# Patient Record
Sex: MALE | Race: WHITE | NOT HISPANIC OR LATINO
[De-identification: names, ages, dates, MRNs, and addresses within clinical notes are randomized per-mention and may not be internally consistent; named-entity substitution may affect disease eponyms.]

---

## 2017-07-25 ENCOUNTER — APPOINTMENT (OUTPATIENT)
Dept: COLORECTAL SURGERY | Facility: CLINIC | Age: 57
End: 2017-07-25

## 2017-07-25 VITALS — HEART RATE: 60 BPM | RESPIRATION RATE: 14 BRPM | TEMPERATURE: 97.8 F

## 2017-07-25 DIAGNOSIS — L29.0 PRURITUS ANI: ICD-10-CM

## 2017-07-25 DIAGNOSIS — Z80.9 FAMILY HISTORY OF MALIGNANT NEOPLASM, UNSPECIFIED: ICD-10-CM

## 2017-07-25 DIAGNOSIS — K64.9 UNSPECIFIED HEMORRHOIDS: ICD-10-CM

## 2017-07-25 DIAGNOSIS — Z80.0 FAMILY HISTORY OF MALIGNANT NEOPLASM OF DIGESTIVE ORGANS: ICD-10-CM

## 2017-07-25 DIAGNOSIS — Z78.9 OTHER SPECIFIED HEALTH STATUS: ICD-10-CM

## 2017-07-25 DIAGNOSIS — Z87.39 PERSONAL HISTORY OF OTHER DISEASES OF THE MUSCULOSKELETAL SYSTEM AND CONNECTIVE TISSUE: ICD-10-CM

## 2017-07-25 DIAGNOSIS — Z86.79 PERSONAL HISTORY OF OTHER DISEASES OF THE CIRCULATORY SYSTEM: ICD-10-CM

## 2018-02-13 ENCOUNTER — NON-APPOINTMENT (OUTPATIENT)
Age: 58
End: 2018-02-13

## 2018-02-13 ENCOUNTER — APPOINTMENT (OUTPATIENT)
Dept: ELECTROPHYSIOLOGY | Facility: CLINIC | Age: 58
End: 2018-02-13
Payer: COMMERCIAL

## 2018-02-13 VITALS
WEIGHT: 195 LBS | HEIGHT: 70 IN | SYSTOLIC BLOOD PRESSURE: 140 MMHG | DIASTOLIC BLOOD PRESSURE: 84 MMHG | HEART RATE: 64 BPM | BODY MASS INDEX: 27.92 KG/M2

## 2018-02-13 PROCEDURE — 99205 OFFICE O/P NEW HI 60 MIN: CPT

## 2018-02-13 PROCEDURE — 93000 ELECTROCARDIOGRAM COMPLETE: CPT

## 2018-02-13 RX ORDER — CEFUROXIME AXETIL 250 MG/1
250 TABLET ORAL
Qty: 14 | Refills: 0 | Status: DISCONTINUED | COMMUNITY
Start: 2017-05-15 | End: 2018-02-13

## 2018-02-13 RX ORDER — HYDROCORTISONE 25 MG/G
2.5 CREAM TOPICAL
Qty: 2835 | Refills: 0 | Status: DISCONTINUED | COMMUNITY
Start: 2016-06-03 | End: 2018-02-13

## 2018-02-13 RX ORDER — CIPROFLOXACIN 3 MG/ML
0.3 SOLUTION OPHTHALMIC
Qty: 5 | Refills: 0 | Status: DISCONTINUED | COMMUNITY
Start: 2017-05-15 | End: 2018-02-13

## 2018-03-13 ENCOUNTER — APPOINTMENT (OUTPATIENT)
Dept: MRI IMAGING | Facility: CLINIC | Age: 58
End: 2018-03-13

## 2018-03-13 ENCOUNTER — OUTPATIENT (OUTPATIENT)
Dept: OUTPATIENT SERVICES | Facility: HOSPITAL | Age: 58
LOS: 1 days | End: 2018-03-13
Payer: COMMERCIAL

## 2018-03-13 DIAGNOSIS — Z00.8 ENCOUNTER FOR OTHER GENERAL EXAMINATION: ICD-10-CM

## 2018-03-13 PROCEDURE — 75561 CARDIAC MRI FOR MORPH W/DYE: CPT | Mod: 26

## 2018-03-13 PROCEDURE — 75561 CARDIAC MRI FOR MORPH W/DYE: CPT

## 2018-03-13 PROCEDURE — A9585: CPT

## 2018-04-11 ENCOUNTER — APPOINTMENT (OUTPATIENT)
Dept: ELECTROPHYSIOLOGY | Facility: CLINIC | Age: 58
End: 2018-04-11
Payer: COMMERCIAL

## 2018-04-11 ENCOUNTER — NON-APPOINTMENT (OUTPATIENT)
Age: 58
End: 2018-04-11

## 2018-04-11 VITALS
SYSTOLIC BLOOD PRESSURE: 130 MMHG | BODY MASS INDEX: 27.92 KG/M2 | HEIGHT: 70 IN | DIASTOLIC BLOOD PRESSURE: 80 MMHG | HEART RATE: 64 BPM | WEIGHT: 195 LBS

## 2018-04-11 PROCEDURE — 99215 OFFICE O/P EST HI 40 MIN: CPT

## 2018-07-27 PROBLEM — Z80.0 FAMILY HISTORY OF COLON CANCER: Status: ACTIVE | Noted: 2017-07-25

## 2018-08-01 ENCOUNTER — APPOINTMENT (OUTPATIENT)
Dept: CARDIOTHORACIC SURGERY | Facility: CLINIC | Age: 58
End: 2018-08-01
Payer: COMMERCIAL

## 2018-08-01 VITALS
RESPIRATION RATE: 15 BRPM | WEIGHT: 195 LBS | TEMPERATURE: 98.4 F | OXYGEN SATURATION: 97 % | BODY MASS INDEX: 27.92 KG/M2 | DIASTOLIC BLOOD PRESSURE: 78 MMHG | HEIGHT: 70 IN | SYSTOLIC BLOOD PRESSURE: 146 MMHG | HEART RATE: 65 BPM

## 2018-08-01 VITALS — DIASTOLIC BLOOD PRESSURE: 86 MMHG | SYSTOLIC BLOOD PRESSURE: 137 MMHG

## 2018-08-01 PROCEDURE — 99201 OFFICE OUTPATIENT NEW 10 MINUTES: CPT | Mod: 25

## 2018-08-01 PROCEDURE — 99245 OFF/OP CONSLTJ NEW/EST HI 55: CPT

## 2018-08-22 ENCOUNTER — OUTPATIENT (OUTPATIENT)
Dept: OUTPATIENT SERVICES | Age: 58
LOS: 1 days | Discharge: HOME | End: 2018-08-22

## 2018-08-23 ENCOUNTER — APPOINTMENT (OUTPATIENT)
Dept: PEDIATRIC CARDIOLOGY | Facility: CLINIC | Age: 58
End: 2018-08-23
Payer: COMMERCIAL

## 2018-08-23 ENCOUNTER — APPOINTMENT (OUTPATIENT)
Dept: CARDIOTHORACIC SURGERY | Facility: CLINIC | Age: 58
End: 2018-08-23
Payer: COMMERCIAL

## 2018-08-23 VITALS
DIASTOLIC BLOOD PRESSURE: 82 MMHG | SYSTOLIC BLOOD PRESSURE: 129 MMHG | RESPIRATION RATE: 16 BRPM | BODY MASS INDEX: 28.73 KG/M2 | HEIGHT: 69.69 IN | HEART RATE: 64 BPM | OXYGEN SATURATION: 96 % | WEIGHT: 198.42 LBS

## 2018-08-23 DIAGNOSIS — Z13.6 ENCOUNTER FOR SCREENING FOR CARDIOVASCULAR DISORDERS: ICD-10-CM

## 2018-08-23 PROCEDURE — 93320 DOPPLER ECHO COMPLETE: CPT

## 2018-08-23 PROCEDURE — 93325 DOPPLER ECHO COLOR FLOW MAPG: CPT

## 2018-08-23 PROCEDURE — 99205 OFFICE O/P NEW HI 60 MIN: CPT | Mod: 25

## 2018-08-23 PROCEDURE — 93303 ECHO TRANSTHORACIC: CPT

## 2018-08-23 PROCEDURE — 99244 OFF/OP CNSLTJ NEW/EST MOD 40: CPT

## 2018-08-23 PROCEDURE — 93000 ELECTROCARDIOGRAM COMPLETE: CPT

## 2019-03-06 ENCOUNTER — APPOINTMENT (OUTPATIENT)
Dept: ELECTROPHYSIOLOGY | Facility: CLINIC | Age: 59
End: 2019-03-06

## 2019-03-26 ENCOUNTER — NON-APPOINTMENT (OUTPATIENT)
Age: 59
End: 2019-03-26

## 2019-03-26 ENCOUNTER — APPOINTMENT (OUTPATIENT)
Dept: ELECTROPHYSIOLOGY | Facility: CLINIC | Age: 59
End: 2019-03-26
Payer: COMMERCIAL

## 2019-03-26 VITALS
BODY MASS INDEX: 28 KG/M2 | HEIGHT: 71 IN | DIASTOLIC BLOOD PRESSURE: 77 MMHG | WEIGHT: 200 LBS | HEART RATE: 65 BPM | SYSTOLIC BLOOD PRESSURE: 126 MMHG

## 2019-03-26 PROCEDURE — 93000 ELECTROCARDIOGRAM COMPLETE: CPT

## 2019-03-26 PROCEDURE — 99215 OFFICE O/P EST HI 40 MIN: CPT

## 2019-04-22 NOTE — ASSESSMENT
[FreeTextEntry1] : Patient is asymptomatic able to do all activities of daily living. Exercises 3 times per week.

## 2019-04-22 NOTE — PHYSICAL EXAM
[Normal Appearance] : normal appearance [General Appearance - Well Developed] : well developed [Well Groomed] : well groomed [General Appearance - Well Nourished] : well nourished [General Appearance - In No Acute Distress] : no acute distress [No Deformities] : no deformities [Normal Conjunctiva] : the conjunctiva exhibited no abnormalities [Eyelids - No Xanthelasma] : the eyelids demonstrated no xanthelasmas [Normal Oral Mucosa] : normal oral mucosa [No Oral Pallor] : no oral pallor [Normal Jugular Venous A Waves Present] : normal jugular venous A waves present [No Oral Cyanosis] : no oral cyanosis [Normal Jugular Venous V Waves Present] : normal jugular venous V waves present [No Jugular Venous Sanchez A Waves] : no jugular venous sanchez A waves [Respiration, Rhythm And Depth] : normal respiratory rhythm and effort [Exaggerated Use Of Accessory Muscles For Inspiration] : no accessory muscle use [Auscultation Breath Sounds / Voice Sounds] : lungs were clear to auscultation bilaterally [Abdomen Soft] : soft [Abdomen Tenderness] : non-tender [Abnormal Walk] : normal gait [Abdomen Mass (___ Cm)] : no abdominal mass palpated [Nail Clubbing] : no clubbing of the fingernails [Cyanosis, Localized] : no localized cyanosis [Gait - Sufficient For Exercise Testing] : the gait was sufficient for exercise testing [Petechial Hemorrhages (___cm)] : no petechial hemorrhages [] : no rash [Skin Color & Pigmentation] : normal skin color and pigmentation [No Venous Stasis] : no venous stasis [Skin Lesions] : no skin lesions [No Skin Ulcers] : no skin ulcer [No Xanthoma] : no  xanthoma was observed [Oriented To Time, Place, And Person] : oriented to person, place, and time [Affect] : the affect was normal [No Anxiety] : not feeling anxious [Mood] : the mood was normal [5th Left ICS - MCL] : palpated at the 5th LICS in the midclavicular line [Normal] : normal [Normal Rate] : normal [No Precordial Heave] : no precordial heave was noted [Rhythm Regular] : regular [Normal S2] : normal S2 [Normal S1] : normal S1 [S4] : no S4 [S3] : no S3 [II] : a grade 2 [Right Carotid Bruit] : no bruit heard over the right carotid [Left Carotid Bruit] : no bruit heard over the left carotid [Right Femoral Bruit] : no bruit heard over the right femoral artery [Left Femoral Bruit] : no bruit heard over the left femoral artery [2+] : right 2+ [No Pitting Edema] : no pitting edema present [No Abnormalities] : the abdominal aorta was not enlarged and no bruit was heard

## 2019-04-22 NOTE — HISTORY OF PRESENT ILLNESS
[FreeTextEntry1] : This is a 58-year-old gentleman, who presents for evaluation after an abnormal echo. He has a history of an abnormal EKG in the past that resulted in multiple past cardiac MRI. The reports are not available to me but it is reported the first two were read as consistent with ARVD and the last as negative. 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 \par \par He had several surgical opinions and was not felt not to need surgical correction given lack of symptoms. \par \par MARCY CAR  denies chest pain, chest pressure, shortness of breath, lightheadedness, dizziness, palpitations, syncope, presyncope, orthopnea, PND, or edema.

## 2020-10-08 ENCOUNTER — APPOINTMENT (OUTPATIENT)
Dept: CARDIOLOGY | Facility: CLINIC | Age: 60
End: 2020-10-08

## 2020-10-09 ENCOUNTER — APPOINTMENT (OUTPATIENT)
Dept: CARDIOLOGY | Facility: CLINIC | Age: 60
End: 2020-10-09
Payer: COMMERCIAL

## 2020-10-09 ENCOUNTER — NON-APPOINTMENT (OUTPATIENT)
Age: 60
End: 2020-10-09

## 2020-10-09 VITALS
SYSTOLIC BLOOD PRESSURE: 151 MMHG | HEIGHT: 71 IN | BODY MASS INDEX: 27.86 KG/M2 | DIASTOLIC BLOOD PRESSURE: 86 MMHG | WEIGHT: 199 LBS | OXYGEN SATURATION: 95 % | HEART RATE: 69 BPM

## 2020-10-09 DIAGNOSIS — K64.5 PERIANAL VENOUS THROMBOSIS: ICD-10-CM

## 2020-10-09 PROCEDURE — 99214 OFFICE O/P EST MOD 30 MIN: CPT

## 2020-10-09 PROCEDURE — 93000 ELECTROCARDIOGRAM COMPLETE: CPT

## 2020-10-09 RX ORDER — MELOXICAM 15 MG/1
15 TABLET ORAL
Qty: 1 | Refills: 0 | Status: DISCONTINUED | COMMUNITY
Start: 2019-03-26 | End: 2020-10-09

## 2020-10-09 RX ORDER — METRONIDAZOLE 7.5 MG/G
0.75 CREAM TOPICAL
Qty: 45 | Refills: 0 | Status: DISCONTINUED | COMMUNITY
Start: 2016-10-13 | End: 2020-10-09

## 2020-10-09 NOTE — HISTORY OF PRESENT ILLNESS
[FreeTextEntry1] : 61 yo male presents for evaluation of congenital heart disease. Pt has a history anomalous pulmonary venous return. Pt denies symptoms of chest pain, palpitations or dyspnea. He was evaluated at [place] and at [facility]. MRI was reviewed.

## 2020-10-09 NOTE — PHYSICAL EXAM
[General Appearance - Well Developed] : well developed [Normal Appearance] : normal appearance [Well Groomed] : well groomed [General Appearance - Well Nourished] : well nourished [No Deformities] : no deformities [General Appearance - In No Acute Distress] : no acute distress [Normal Conjunctiva] : the conjunctiva exhibited no abnormalities [Eyelids - No Xanthelasma] : the eyelids demonstrated no xanthelasmas [Normal Oral Mucosa] : normal oral mucosa [No Oral Pallor] : no oral pallor [No Oral Cyanosis] : no oral cyanosis [Normal Jugular Venous A Waves Present] : normal jugular venous A waves present [Normal Jugular Venous V Waves Present] : normal jugular venous V waves present [No Jugular Venous Sanchez A Waves] : no jugular venous sanchez A waves [Respiration, Rhythm And Depth] : normal respiratory rhythm and effort [Exaggerated Use Of Accessory Muscles For Inspiration] : no accessory muscle use [Auscultation Breath Sounds / Voice Sounds] : lungs were clear to auscultation bilaterally [Heart Rate And Rhythm] : heart rate and rhythm were normal [Heart Sounds] : normal S1 and S2 [Murmurs] : no murmurs present [Abdomen Soft] : soft [Abdomen Tenderness] : non-tender [Abdomen Mass (___ Cm)] : no abdominal mass palpated [Abnormal Walk] : normal gait [Gait - Sufficient For Exercise Testing] : the gait was sufficient for exercise testing [Nail Clubbing] : no clubbing of the fingernails [Cyanosis, Localized] : no localized cyanosis [Petechial Hemorrhages (___cm)] : no petechial hemorrhages [Skin Color & Pigmentation] : normal skin color and pigmentation [] : no rash [No Venous Stasis] : no venous stasis [Skin Lesions] : no skin lesions [No Skin Ulcers] : no skin ulcer [No Xanthoma] : no  xanthoma was observed [Oriented To Time, Place, And Person] : oriented to person, place, and time [Affect] : the affect was normal [Mood] : the mood was normal [No Anxiety] : not feeling anxious

## 2020-12-29 ENCOUNTER — APPOINTMENT (OUTPATIENT)
Dept: CARDIOLOGY | Facility: CLINIC | Age: 60
End: 2020-12-29
Payer: COMMERCIAL

## 2020-12-29 PROCEDURE — 93306 TTE W/DOPPLER COMPLETE: CPT

## 2020-12-29 PROCEDURE — 99072 ADDL SUPL MATRL&STAF TM PHE: CPT

## 2021-12-03 ENCOUNTER — APPOINTMENT (OUTPATIENT)
Dept: ELECTROPHYSIOLOGY | Facility: CLINIC | Age: 61
End: 2021-12-03
Payer: COMMERCIAL

## 2021-12-03 ENCOUNTER — NON-APPOINTMENT (OUTPATIENT)
Age: 61
End: 2021-12-03

## 2021-12-03 VITALS
RESPIRATION RATE: 18 BRPM | BODY MASS INDEX: 27.09 KG/M2 | OXYGEN SATURATION: 95 % | DIASTOLIC BLOOD PRESSURE: 86 MMHG | SYSTOLIC BLOOD PRESSURE: 148 MMHG | WEIGHT: 194.25 LBS | HEART RATE: 75 BPM

## 2021-12-03 VITALS — DIASTOLIC BLOOD PRESSURE: 72 MMHG | SYSTOLIC BLOOD PRESSURE: 128 MMHG

## 2021-12-03 PROCEDURE — 99213 OFFICE O/P EST LOW 20 MIN: CPT

## 2021-12-03 PROCEDURE — 93000 ELECTROCARDIOGRAM COMPLETE: CPT

## 2021-12-28 NOTE — HISTORY OF PRESENT ILLNESS
[FreeTextEntry1] : This is a 61-year-old gentleman who presents for follow-up of NSVT. He has a history of a past cardiac MRI read is consistent with ARVD.  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  He was referred for evaluation by cardiothoracic surgery that felt the shunt was inadequate to recommend a surgical intervention. MARCY CAR  denies chest pain, chest pressure, shortness of breath, lightheadedness, dizziness, palpitations, syncope, presyncope, orthopnea, PND, or edema.

## 2021-12-28 NOTE — ASSESSMENT
[FreeTextEntry1] : This is a 61-year-old gentleman with a history of an abnormal EKG who was found to have mildly dilated RV with partial anomalous return of the pulmonary veins.  He is asymptomatic.

## 2021-12-28 NOTE — CARDIOLOGY SUMMARY
[de-identified] : 12/29/2020 :  Mitral Annular calcification, calcified mitral leaflets normal diastolic opening. minimal MR. Calcified trileaflet aortic valve. mild AR. mildly dilated Ao root 3.8 cm. Normal LV internal dimensions and wall thickness.  Normal LV systolic function with mild paradoxical septal motion noted.  Mild diastolic dysfunction.  RV enlargement with decreased RV systolic function.  Normal tricuspid valve minimal tricuspid regurgitation no significant TR to measure RVSP.  Mild to moderate pulmonic regurg\par  [de-identified] : 3/13/2018 : Cardiac morphology:\par The cardiac chambers demonstrate normal atrioventricular and \par ventriculoarterial concordance. The visualized thoracic aorta is normal \par in course, caliber, and contour. The main pulmonary artery is normal in \par size.  There is left upper lobe anomalous pulmonary venous return. \par \par VENTIRCLES: \par \par Left Ventricle: \par There is normal LV size and normal global systolic function. No regional \par wall motion abnormalities are present. No thrombus is seen in the left \par ventricle.  No delayed enhancement of the LV myocardium is seen.\par \par Absolute measure      \par \par LVEDV: 178 ml    \par LVESV: 84 ml    \par LVSV: 95 ml  \par LVEF: 53 % (normal > 54%)\par \par Cardiac output: 5.7 L/min\par Cardiac index: 2.7 L/min/m2 \par LV mass: 169 g\par \par \par LV measurements: (I=Indexed to BSA)\par LVEDVI: 84 ml/m2 (normal < 95)\par LVESVI: 40 ml/m2\par LVSVI: 52 ml/m2 \par LV mass indexed: 80 g/m2 (normal < 80)\par \par \par LV SHINE: 54 mm (normal < 62)\par Anterior septal wall: 6 mm\par Posterior lateral wall: 7 mm\par \par \par LV ESD: 44 mm\par \par Left atrium: \par The left atrium is normal in size.\par \par \par Right Ventricle: \par \par The right ventricle is enlarged and diffusely hypokinetic. Within the \par inferolateral segments of the RV, (series 11, images 1-20) there is \par suggestion of small focal area of dyskinesis.\par \par Absolute measure      "-I" (Indexed to BSA)\par RVEDV: 272 ml   \par RVESV: 173 ml    \par RVSV: 99 ml      \par RVEF: 36 % (normal > 46%)\par \par RVEDVI: 129 ml/m2 (normal= )\par RVESVI: 82 and ml/m2 \par RVSVI: 47 ml/m2 \par \par \par Right atrium: \par The right atrium is enlarged in size. \par \par \par Aortic valve: \par Quantification was not performed; however, qualitatively there are no \par abnormal flow jets to suggest aortic stenosis or regurgitation. \par \par Pulmonic valve: Quantification was not performed; however, qualitatively \par there are no abnormal flow jets to suggest aortic stenosis or \par regurgitation\par Not specifically imaged, but no gross pulmonic stenosis or regurgitation \par seen.  \par Mitral valve:\par No significant mitral stenosis or regurgitation.\par \par Tricuspid valve: \par No significant tricuspid stenosis or regurgitation.\par \par Pericardium: \par No pericardial thickening or pericardial effusion is identified.\par \par Extracardiac findings: A hepatic cyst is noted. \par The chest wall and mediastinum are unremarkable. No pleural effusions.  \par Limited evaluation of the lungs demonstrate no abnormal signal \par characteristics.\par \par \par IMPRESSION:\par \par \par Diffusely hypokinetic RV with Calculated RVEF = 36%\par \par Dilated RV with Indexed RVEDV = 129 ml/m2 (nl )\par \par No delayed enhancement\par \par Incidental note made of partial anomalous pulmonary venous return from \par the left upper lobe into the left brachiocephalic vein, as seen on prior\par \par \par \par

## 2022-06-27 ENCOUNTER — APPOINTMENT (OUTPATIENT)
Dept: GASTROENTEROLOGY | Facility: CLINIC | Age: 62
End: 2022-06-27

## 2022-06-27 VITALS
HEIGHT: 71 IN | RESPIRATION RATE: 12 BRPM | WEIGHT: 191 LBS | HEART RATE: 76 BPM | DIASTOLIC BLOOD PRESSURE: 80 MMHG | BODY MASS INDEX: 26.74 KG/M2 | SYSTOLIC BLOOD PRESSURE: 138 MMHG | OXYGEN SATURATION: 98 %

## 2022-06-27 DIAGNOSIS — Z12.11 ENCOUNTER FOR SCREENING FOR MALIGNANT NEOPLASM OF COLON: ICD-10-CM

## 2022-06-27 PROCEDURE — 99204 OFFICE O/P NEW MOD 45 MIN: CPT

## 2022-06-27 RX ORDER — FINASTERIDE 1 MG/1
1 TABLET ORAL
Qty: 90 | Refills: 0 | Status: ACTIVE | COMMUNITY
Start: 2022-01-11

## 2022-06-27 RX ORDER — TAZAROTENE 1 MG/G
0.1 CREAM TOPICAL
Qty: 30 | Refills: 0 | Status: ACTIVE | COMMUNITY
Start: 2022-05-09

## 2022-06-30 ENCOUNTER — NON-APPOINTMENT (OUTPATIENT)
Age: 62
End: 2022-06-30

## 2022-07-01 ENCOUNTER — NON-APPOINTMENT (OUTPATIENT)
Age: 62
End: 2022-07-01

## 2022-07-01 ENCOUNTER — APPOINTMENT (OUTPATIENT)
Dept: CARDIOLOGY | Facility: CLINIC | Age: 62
End: 2022-07-01

## 2022-07-01 VITALS
HEIGHT: 71 IN | SYSTOLIC BLOOD PRESSURE: 106 MMHG | DIASTOLIC BLOOD PRESSURE: 74 MMHG | WEIGHT: 201.5 LBS | HEART RATE: 60 BPM | BODY MASS INDEX: 28.21 KG/M2 | OXYGEN SATURATION: 98 %

## 2022-07-01 DIAGNOSIS — Z00.00 ENCOUNTER FOR GENERAL ADULT MEDICAL EXAMINATION W/OUT ABNORMAL FINDINGS: ICD-10-CM

## 2022-07-01 PROCEDURE — 99214 OFFICE O/P EST MOD 30 MIN: CPT

## 2022-07-01 PROCEDURE — 93000 ELECTROCARDIOGRAM COMPLETE: CPT

## 2022-07-01 RX ORDER — MULTIVITAMIN
TABLET ORAL DAILY
Refills: 0 | Status: ACTIVE | COMMUNITY

## 2022-07-01 NOTE — HISTORY OF PRESENT ILLNESS
[FreeTextEntry1] : 60 yo male presents for evaluation of congenital heart disease. Pt has a history anomalous pulmonary venous return. Pt denies symptoms of chest pain, palpitations or dyspnea. He was evaluated at [place] and at [facility]. MRI was reviewed. Pt has been asymptomatic until recently when he felt lightheaded upon arising lasting 25 seconds to a minute. He may have not had a lot to drink that day. Past records were reviewed.

## 2022-07-01 NOTE — REASON FOR VISIT
[Symptom and Test Evaluation] : symptom and test evaluation [Structural Heart and Valve Disease] : structural heart and valve disease [Follow-Up - Clinic] : a clinic follow-up of

## 2022-07-05 NOTE — HISTORY OF PRESENT ILLNESS
[de-identified] : 62yo male with dysphagia\par \par He has noticed increasing dysphagia to solids\par Now also with altered bowels with looser stool\par

## 2022-07-05 NOTE — PHYSICAL EXAM
[General Appearance - Alert] : alert [General Appearance - In No Acute Distress] : in no acute distress [Auscultation Breath Sounds / Voice Sounds] : lungs were clear to auscultation bilaterally [Heart Rate And Rhythm] : heart rate was normal and rhythm regular [Heart Sounds] : normal S1 and S2 [Heart Sounds Gallop] : no gallops [Murmurs] : no murmurs [Heart Sounds Pericardial Friction Rub] : no pericardial rub [Bowel Sounds] : normal bowel sounds [Abdomen Soft] : soft [Abdomen Tenderness] : non-tender [] : no hepato-splenomegaly [Abdomen Mass (___ Cm)] : no abdominal mass palpated [Nail Clubbing] : no clubbing  or cyanosis of the fingernails [Abnormal Walk] : normal gait [Musculoskeletal - Swelling] : no joint swelling seen [Motor Tone] : muscle strength and tone were normal [Oriented To Time, Place, And Person] : oriented to person, place, and time [Impaired Insight] : insight and judgment were intact [Affect] : the affect was normal

## 2022-07-05 NOTE — ASSESSMENT
[FreeTextEntry1] : 62yo male with dysphagia, altered bowle habits\par \par Will check egd with possible dilation\par Will check colonoscopy\par Risks and benefits of procedure(s) discussed with patient in detail, including but not limited to, perforation, bleeding, reaction to anesthesia, missed lesions.\par

## 2022-08-03 ENCOUNTER — LABORATORY RESULT (OUTPATIENT)
Age: 62
End: 2022-08-03

## 2022-08-08 ENCOUNTER — APPOINTMENT (OUTPATIENT)
Dept: GASTROENTEROLOGY | Facility: AMBULATORY MEDICAL SERVICES | Age: 62
End: 2022-08-08

## 2022-08-08 ENCOUNTER — RESULT REVIEW (OUTPATIENT)
Age: 62
End: 2022-08-08

## 2022-08-08 PROCEDURE — 45380 COLONOSCOPY AND BIOPSY: CPT | Mod: GC

## 2022-08-08 PROCEDURE — 43239 EGD BIOPSY SINGLE/MULTIPLE: CPT | Mod: GC,59

## 2022-08-22 ENCOUNTER — APPOINTMENT (OUTPATIENT)
Dept: CARDIOLOGY | Facility: CLINIC | Age: 62
End: 2022-08-22

## 2022-08-22 PROCEDURE — 93880 EXTRACRANIAL BILAT STUDY: CPT

## 2022-08-22 PROCEDURE — 93306 TTE W/DOPPLER COMPLETE: CPT

## 2022-09-02 ENCOUNTER — APPOINTMENT (OUTPATIENT)
Dept: ORTHOPEDIC SURGERY | Facility: CLINIC | Age: 62
End: 2022-09-02

## 2022-09-02 PROCEDURE — 99203 OFFICE O/P NEW LOW 30 MIN: CPT | Mod: 25

## 2022-09-02 PROCEDURE — 73030 X-RAY EXAM OF SHOULDER: CPT | Mod: 26,LT

## 2022-09-02 PROCEDURE — 20610 DRAIN/INJ JOINT/BURSA W/O US: CPT | Mod: LT

## 2022-09-09 ENCOUNTER — APPOINTMENT (OUTPATIENT)
Dept: ORTHOPEDIC SURGERY | Facility: CLINIC | Age: 62
End: 2022-09-09

## 2022-09-09 PROCEDURE — 20610 DRAIN/INJ JOINT/BURSA W/O US: CPT | Mod: RT

## 2022-09-13 NOTE — HISTORY OF PRESENT ILLNESS
[de-identified] : The patient comes in today with complaints of pain to his bilateral shoulders (left worse than the right).  The patient states the onset/injury occurred on 06/01/2010.  This injury is not work related or due to an automobile accident.  The patient states the pain is localized.  The patient describes the pain as sharp and stabbing. [2] : a current pain level of 2/10 [de-identified] : Certain arm movements [de-identified] : Rest, heat and ice

## 2022-09-13 NOTE — ADDENDUM
[FreeTextEntry1] : This note was written by Giuliano Bowen on 09/13/2022, acting as a scribe for TANISHA BETTENCOURT, BELTRAN/L, PA

## 2022-09-13 NOTE — PHYSICAL EXAM
[Normal] : Gait: normal [de-identified] : \par Right Shoulder: Range of motion is significantly decreased, especially on the left.  It is probably only about 95 degrees of flexion and abduction.  Internal and external rotation are full, but painful in ranges.  Diffuse crepitations and tenderness throughout range of motion.  Pain and swelling throughout range of motion, more significant at extremes.  No motor weakness to internal rotation, external rotation or abduction in the scapular plane.  Negative crank test.  Negative O’Jimmy’s test.  Negative Speed’s test.  Negative Yergason’s test.  Negative cross arm test.  No tenderness to palpation at the AC joint. Negative Hawkin’s sign.  Negative Neer’s sign.  Negative apprehension. Negative sulcus sign.  No gross neurological or vascular deficits distally.  Skin is intact.  No rashes, scars or lesions.  2+ radial and ulnar pulses. No extra-articular swelling or tenderness.\par \par Left Shoulder: Range of motion is significantly decreased, especially on the left.  It is probably only about 95 degrees of flexion and abduction.  Internal and external rotation are full, but painful in ranges.  Diffuse crepitations and tenderness throughout range of motion.  Pain and swelling throughout range of motion, more significant at extremes.  No motor weakness to internal rotation, external rotation or abduction in the scapular plane.  Negative crank test.  Negative O’Jimmy’s test.  Negative Speed’s test.  Negative Yergason’s test.  Negative cross arm test.  No tenderness to palpation at the AC joint.  Negative Hawkin’s sign.  Negative Neer’s sign.  Negative apprehension. Negative sulcus sign.  No gross neurological or vascular deficits distally.  Skin is intact.  No rashes, scars or lesions.  2+ radial and ulnar pulses. No extra-articular swelling or tenderness.\par   [de-identified] : Appearance:  Well developed, well-nourished male in no acute distress.\par   [de-identified] : Radiographs, two views of the right shoulder taken in the office today, reveal severe osteoarthritis.\par Radiographs, two views of the left shoulder taken in the office today, reveal severe osteoarthritis.\par \par

## 2022-09-13 NOTE — DISCUSSION/SUMMARY
[de-identified] : At this time, I recommended ice and elevation for the left shoulder osteoarthritis.  The patient is going to come back for the right shoulder osteoarthritis next week.  A discussion was had concerning a total reverse shoulder for the left and we would send him out.\par

## 2022-09-14 NOTE — PHYSICAL EXAM
[de-identified] : Right Shoulder: \par Range of Motion in Degrees:  	\par 	                                  Claimant:	Normal:	\par Abduction (Active)	                  180	               180 degrees	\par Abduction (Passive)	  180	               180 degrees	\par Forward elevation (Active):	  180	               180 degrees	\par Forward elevation (Passive):	  180	               180 degrees	\par External rotation (Active):	   45	               45 degrees	\par External rotation (Passive):	   45	               45 degrees	\par Internal rotation (Active):	   L-1	               L-1	\par Internal rotation (Passive):	   L-1	               L-1\par 	\par Diffuse crepitations and tenderness throughout range of motion.  Pain and swelling throughout range of motion, more significant at extremes.  No motor weakness to internal rotation, external rotation or abduction in the scapular plane.  Negative crank test.  Negative O’Jimmy’s test.  Negative Speed’s test.  Negative Yergason’s test.  Negative cross arm test.  No tenderness to palpation at the AC joint. Negative Hawkin’s sign.  Negative Neer’s sign.  Negative apprehension. Negative sulcus sign.  No gross neurological or vascular deficits distally.  Skin is intact.  No rashes, scars or lesions.  2+ radial and ulnar pulses. No extra-articular swelling or tenderness.\par  \par Left Shoulder: \par Range of Motion in Degrees:  	\par 	                                  Claimant:	Normal:	\par Abduction (Active)	                  180	               180 degrees	\par Abduction (Passive)	  180	               180 degrees	\par Forward elevation (Active):	  180	               180 degrees	\par Forward elevation (Passive):	  180	               180 degrees	\par External rotation (Active):	   45	               45 degrees	\par External rotation (Passive):	   45	               45 degrees	\par Internal rotation (Active):	   L-1	               L-1	\par Internal rotation (Passive):	   L-1	               L-1\par 	\par Diffuse crepitations and tenderness throughout range of motion.  Pain and swelling throughout range of motion, more significant at extremes.  No motor weakness to internal rotation, external rotation or abduction in the scapular plane.  Negative crank test.  Negative O’Jimmy’s test.  Negative Speed’s test.  Negative Yergason’s test.  Negative cross arm test.  No tenderness to palpation at the AC joint.  Negative Hawkin’s sign.  Negative Neer’s sign.  Negative apprehension. Negative sulcus sign.  No gross neurological or vascular deficits distally.  Skin is intact.  No rashes, scars or lesions.  2+ radial and ulnar pulses. No extra-articular swelling or tenderness.\par  [de-identified] : Ambulating with a slightly antalgic to antalgic gait.  Station:  Normal.  [de-identified] : Appearance:  Well-developed, well-nourished male in no acute distress.\par

## 2022-09-14 NOTE — HISTORY OF PRESENT ILLNESS
[de-identified] : The patient comes in today for a cortisone injection to his right shoulder. He was here last week for both shoulders.

## 2022-09-14 NOTE — PROCEDURE
[de-identified] : Indication:  Osteoarthritis of Right Shoulder\par \par Consent: \par At this time, I have recommended an injection to the right shoulder.  The risks and benefits of the procedure were discussed with the patient in detail.  Upon verbal consent of the patient, we proceeded with the injection as noted below.  \par \par Procedure:  \par After a sterile prep, the patient underwent an injection of approximately 3mL of 1% Xylocaine 20 mg per 2 mL (10 mg per mL) without epinephrine and 2 mL of Kenalog (40mg/mL) into the right shoulder .  The patient tolerated the procedure well.  There were no complications.  \par \par : [facility]\par Drug name:     Xylocaine-MPF (Lidocaine HCI Injection, USP)\par NDC #:            17771-098-66\par Lot #:              [id number]\par Expiration:      05/26

## 2022-09-26 ENCOUNTER — APPOINTMENT (OUTPATIENT)
Dept: GASTROENTEROLOGY | Facility: CLINIC | Age: 62
End: 2022-09-26

## 2022-10-10 ENCOUNTER — APPOINTMENT (OUTPATIENT)
Dept: GASTROENTEROLOGY | Facility: CLINIC | Age: 62
End: 2022-10-10

## 2022-10-10 VITALS
DIASTOLIC BLOOD PRESSURE: 74 MMHG | HEIGHT: 71 IN | SYSTOLIC BLOOD PRESSURE: 120 MMHG | WEIGHT: 200 LBS | HEART RATE: 65 BPM | BODY MASS INDEX: 28 KG/M2

## 2022-10-10 LAB
BASOPHILS # BLD AUTO: 0.05 K/UL
BASOPHILS NFR BLD AUTO: 1 %
EOSINOPHIL # BLD AUTO: 0.12 K/UL
EOSINOPHIL NFR BLD AUTO: 2.5 %
HCT VFR BLD CALC: 45.2 %
HGB BLD-MCNC: 15.2 G/DL
IMM GRANULOCYTES NFR BLD AUTO: 0.2 %
LYMPHOCYTES # BLD AUTO: 1.22 K/UL
LYMPHOCYTES NFR BLD AUTO: 25.1 %
MAN DIFF?: NORMAL
MCHC RBC-ENTMCNC: 30.4 PG
MCHC RBC-ENTMCNC: 33.6 GM/DL
MCV RBC AUTO: 90.4 FL
MONOCYTES # BLD AUTO: 0.58 K/UL
MONOCYTES NFR BLD AUTO: 11.9 %
NEUTROPHILS # BLD AUTO: 2.88 K/UL
NEUTROPHILS NFR BLD AUTO: 59.3 %
PLATELET # BLD AUTO: 229 K/UL
RBC # BLD: 5 M/UL
RBC # FLD: 13.7 %
WBC # FLD AUTO: 4.86 K/UL

## 2022-10-10 PROCEDURE — 99214 OFFICE O/P EST MOD 30 MIN: CPT

## 2022-10-10 NOTE — PHYSICAL EXAM
[Alert] : alert [Healthy Appearing] : healthy appearing [Sclera] : the sclera and conjunctiva were normal [No Respiratory Distress] : no respiratory distress [Respiration, Rhythm And Depth] : normal respiratory rhythm and effort [Heart Rate And Rhythm] : heart rate was normal and rhythm regular [Bowel Sounds] : normal bowel sounds [Abdomen Tenderness] : non-tender [Abdomen Soft] : soft [Normal Color / Pigmentation] : normal skin color and pigmentation [Oriented To Time, Place, And Person] : oriented to person, place, and time

## 2022-10-11 LAB
ALBUMIN SERPL ELPH-MCNC: 4.4 G/DL
ALP BLD-CCNC: 70 U/L
ALT SERPL-CCNC: 31 U/L
ANION GAP SERPL CALC-SCNC: 14 MMOL/L
AST SERPL-CCNC: 22 U/L
BILIRUB SERPL-MCNC: 0.3 MG/DL
BUN SERPL-MCNC: 25 MG/DL
CALCIUM SERPL-MCNC: 9.5 MG/DL
CHLORIDE SERPL-SCNC: 105 MMOL/L
CO2 SERPL-SCNC: 21 MMOL/L
CREAT SERPL-MCNC: 1.97 MG/DL
CRP SERPL-MCNC: <3 MG/L
EGFR: 37 ML/MIN/1.73M2
GLUCOSE SERPL-MCNC: 71 MG/DL
HBV SURFACE AB SER QL: NONREACTIVE
HBV SURFACE AG SER QL: NONREACTIVE
POTASSIUM SERPL-SCNC: 4.5 MMOL/L
PROT SERPL-MCNC: 6.6 G/DL
SODIUM SERPL-SCNC: 140 MMOL/L

## 2022-10-11 NOTE — REVIEW OF SYSTEMS
[Diarrhea] : diarrhea [Negative] : Heme/Lymph [Abdominal Pain] : no abdominal pain [Vomiting] : no vomiting [Constipation] : no constipation [Heartburn] : no heartburn [Melena (black stool)] : no melena [Bleeding] : no bleeding [Fecal Incontinence (soiling)] : no fecal incontinence [Bloating (gassiness)] : no bloating

## 2022-10-11 NOTE — HISTORY OF PRESENT ILLNESS
[FreeTextEntry1] : Richard Sumner is a 62 year old male presenting today for follow up visit. Had EGD and colonoscopy in August indicating esophagitis as well as possible Crohn's disease. Pt does admit to having frequent bowel movements that are sometimes diarrhea, denies overt signs of bleeding such as melena or hematochezia. Denies abdominal pain or cramping associated with bowel movements, denies unintentional weight loss.

## 2022-10-11 NOTE — ASSESSMENT
[FreeTextEntry1] : Plan:\par Discussed meaning of both esophagitis and Crohn's disease. Recommend continuing omeprazole 40MG QD for esophagitis, discussed importance of limiting acidic foods. Discussed long term consequences if pt does not take medication for Crohn's disease including increased risk for colon cancer. Will order labs today, allow pt to consider options. Telephonic consult when results become available. All questions answered, pt expressed understanding. Discussed with Dr. Cornelius. I have spent 30 minutes on this encounter.

## 2022-10-12 ENCOUNTER — NON-APPOINTMENT (OUTPATIENT)
Age: 62
End: 2022-10-12

## 2022-10-12 LAB
M TB IFN-G BLD-IMP: NEGATIVE
QUANTIFERON TB PLUS MITOGEN MINUS NIL: 8.8 IU/ML
QUANTIFERON TB PLUS NIL: 0.04 IU/ML
QUANTIFERON TB PLUS TB1 MINUS NIL: -0.02 IU/ML
QUANTIFERON TB PLUS TB2 MINUS NIL: -0.02 IU/ML

## 2022-12-02 ENCOUNTER — NON-APPOINTMENT (OUTPATIENT)
Age: 62
End: 2022-12-02

## 2022-12-02 ENCOUNTER — APPOINTMENT (OUTPATIENT)
Dept: ELECTROPHYSIOLOGY | Facility: CLINIC | Age: 62
End: 2022-12-02

## 2022-12-02 VITALS
OXYGEN SATURATION: 97 % | BODY MASS INDEX: 27.3 KG/M2 | HEIGHT: 71 IN | SYSTOLIC BLOOD PRESSURE: 156 MMHG | WEIGHT: 195 LBS | HEART RATE: 73 BPM | DIASTOLIC BLOOD PRESSURE: 78 MMHG

## 2022-12-02 VITALS — SYSTOLIC BLOOD PRESSURE: 132 MMHG | DIASTOLIC BLOOD PRESSURE: 79 MMHG

## 2022-12-02 DIAGNOSIS — I51.7 CARDIOMEGALY: ICD-10-CM

## 2022-12-02 PROCEDURE — 93000 ELECTROCARDIOGRAM COMPLETE: CPT

## 2022-12-02 PROCEDURE — 99214 OFFICE O/P EST MOD 30 MIN: CPT | Mod: 25

## 2022-12-02 RX ORDER — SODIUM PICOSULFATE, MAGNESIUM OXIDE, AND ANHYDROUS CITRIC ACID 10; 3.5; 12 MG/160ML; G/160ML; G/160ML
10-3.5-12 MG-GM LIQUID ORAL
Qty: 1 | Refills: 0 | Status: COMPLETED | COMMUNITY
Start: 2022-06-27 | End: 2022-12-02

## 2022-12-02 RX ORDER — MESALAMINE 1.2 G/1
1.2 TABLET, DELAYED RELEASE ORAL
Qty: 120 | Refills: 6 | Status: DISCONTINUED | COMMUNITY
Start: 2022-10-24 | End: 2022-12-02

## 2022-12-02 NOTE — HISTORY OF PRESENT ILLNESS
[FreeTextEntry1] : This is a 62-year-old gentleman who presents for follow-up of NSVT. He has a history of a past cardiac MRI read is consistent with ARVD.  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  He was referred for evaluation by cardiothoracic surgery that felt the shunt was inadequate to recommend a surgical intervention. MARCY CAR  denies chest pain, chest pressure, shortness of breath, lightheadedness, dizziness, palpitations, syncope, presyncope, orthopnea, PND, or edema.

## 2022-12-02 NOTE — ASSESSMENT
[FreeTextEntry1] : This is a 62-year-old gentleman with a history of an abnormal EKG who was found to have mildly dilated RV with partial anomalous return of the pulmonary veins.  He is asymptomatic.

## 2022-12-02 NOTE — CARDIOLOGY SUMMARY
[de-identified] : 12/29/2020 :  Mitral Annular calcification, calcified mitral leaflets normal diastolic opening. minimal MR. Calcified trileaflet aortic valve. mild AR. mildly dilated Ao root 3.8 cm. Normal LV internal dimensions and wall thickness.  Normal LV systolic function with mild paradoxical septal motion noted.  Mild diastolic dysfunction.  RV enlargement with decreased RV systolic function.  Normal tricuspid valve minimal tricuspid regurgitation no significant TR to measure RVSP.  Mild to moderate pulmonic regurg\par  [de-identified] : 3/13/2018 : Cardiac morphology:\par The cardiac chambers demonstrate normal atrioventricular and \par ventriculoarterial concordance. The visualized thoracic aorta is normal \par in course, caliber, and contour. The main pulmonary artery is normal in \par size.  There is left upper lobe anomalous pulmonary venous return. \par \par VENTIRCLES: \par \par Left Ventricle: \par There is normal LV size and normal global systolic function. No regional \par wall motion abnormalities are present. No thrombus is seen in the left \par ventricle.  No delayed enhancement of the LV myocardium is seen.\par \par Absolute measure      \par \par LVEDV: 178 ml    \par LVESV: 84 ml    \par LVSV: 95 ml  \par LVEF: 53 % (normal > 54%)\par \par Cardiac output: 5.7 L/min\par Cardiac index: 2.7 L/min/m2 \par LV mass: 169 g\par \par \par LV measurements: (I=Indexed to BSA)\par LVEDVI: 84 ml/m2 (normal < 95)\par LVESVI: 40 ml/m2\par LVSVI: 52 ml/m2 \par LV mass indexed: 80 g/m2 (normal < 80)\par \par \par LV SHINE: 54 mm (normal < 62)\par Anterior septal wall: 6 mm\par Posterior lateral wall: 7 mm\par \par \par LV ESD: 44 mm\par \par Left atrium: \par The left atrium is normal in size.\par \par \par Right Ventricle: \par \par The right ventricle is enlarged and diffusely hypokinetic. Within the \par inferolateral segments of the RV, (series 11, images 1-20) there is \par suggestion of small focal area of dyskinesis.\par \par Absolute measure      "-I" (Indexed to BSA)\par RVEDV: 272 ml   \par RVESV: 173 ml    \par RVSV: 99 ml      \par RVEF: 36 % (normal > 46%)\par \par RVEDVI: 129 ml/m2 (normal= )\par RVESVI: 82 and ml/m2 \par RVSVI: 47 ml/m2 \par \par \par Right atrium: \par The right atrium is enlarged in size. \par \par \par Aortic valve: \par Quantification was not performed; however, qualitatively there are no \par abnormal flow jets to suggest aortic stenosis or regurgitation. \par \par Pulmonic valve: Quantification was not performed; however, qualitatively \par there are no abnormal flow jets to suggest aortic stenosis or \par regurgitation\par Not specifically imaged, but no gross pulmonic stenosis or regurgitation \par seen.  \par Mitral valve:\par No significant mitral stenosis or regurgitation.\par \par Tricuspid valve: \par No significant tricuspid stenosis or regurgitation.\par \par Pericardium: \par No pericardial thickening or pericardial effusion is identified.\par \par Extracardiac findings: A hepatic cyst is noted. \par The chest wall and mediastinum are unremarkable. No pleural effusions.  \par Limited evaluation of the lungs demonstrate no abnormal signal \par characteristics.\par \par \par IMPRESSION:\par \par \par Diffusely hypokinetic RV with Calculated RVEF = 36%\par \par Dilated RV with Indexed RVEDV = 129 ml/m2 (nl )\par \par No delayed enhancement\par \par Incidental note made of partial anomalous pulmonary venous return from \par the left upper lobe into the left brachiocephalic vein, as seen on prior\par \par \par \par

## 2023-05-01 ENCOUNTER — RX RENEWAL (OUTPATIENT)
Age: 63
End: 2023-05-01

## 2023-06-02 ENCOUNTER — APPOINTMENT (OUTPATIENT)
Dept: GASTROENTEROLOGY | Facility: CLINIC | Age: 63
End: 2023-06-02
Payer: COMMERCIAL

## 2023-06-02 VITALS
HEART RATE: 70 BPM | HEIGHT: 70 IN | WEIGHT: 195 LBS | DIASTOLIC BLOOD PRESSURE: 80 MMHG | SYSTOLIC BLOOD PRESSURE: 122 MMHG | BODY MASS INDEX: 27.92 KG/M2

## 2023-06-02 DIAGNOSIS — R19.4 CHANGE IN BOWEL HABIT: ICD-10-CM

## 2023-06-02 DIAGNOSIS — K50.90 CROHN'S DISEASE, UNSPECIFIED, W/OUT COMPLICATIONS: ICD-10-CM

## 2023-06-02 DIAGNOSIS — R13.19 OTHER DYSPHAGIA: ICD-10-CM

## 2023-06-02 PROCEDURE — 99214 OFFICE O/P EST MOD 30 MIN: CPT

## 2023-06-04 NOTE — PHYSICAL EXAM
[Alert] : alert [Normal Voice/Communication] : normal voice/communication [Healthy Appearing] : healthy appearing [No Acute Distress] : no acute distress [Hearing Threshold Finger Rub Not [place]] : hearing was normal [Sclera] : the sclera and conjunctiva were normal [Normal Lips/Gums] : the lips and gums were normal [Oropharynx] : the oropharynx was normal [Normal Appearance] : the appearance of the neck was normal [No Neck Mass] : no neck mass was observed [No Respiratory Distress] : no respiratory distress [Respiration, Rhythm And Depth] : normal respiratory rhythm and effort [No Acc Muscle Use] : no accessory muscle use [Auscultation Breath Sounds / Voice Sounds] : lungs were clear to auscultation bilaterally [Normal S1, S2] : normal S1 and S2 [Heart Rate And Rhythm] : heart rate was normal and rhythm regular [Murmurs] : no murmurs [Bowel Sounds] : normal bowel sounds [Abdomen Tenderness] : non-tender [No Masses] : no abdominal mass palpated [Abdomen Soft] : soft [] : no hepatosplenomegaly [Oriented To Time, Place, And Person] : oriented to person, place, and time

## 2023-06-04 NOTE — HISTORY OF PRESENT ILLNESS
[FreeTextEntry1] : 63yo male ?crohns \par \par He was on budesonide and felt great overall\par \par Pain in his back and shoulders better- told of arthritis in the past\par \par He was on esomeprazole but ran out. He felt much better with less gerd and dysphagia while on it\par \par BM ok with occasional mild constipation

## 2023-06-04 NOTE — ASSESSMENT
[FreeTextEntry1] : 63yo male with ?crohns, gerd/dysphagia\par \par will restart omeprazole\par \par will continue budesonide for now then will taper off if tolerates\par unclear why helped arthritis  symptoms - ?true inflammatory arthritis\par \par will see if going off budesonide affects his BMs signficantly

## 2023-08-29 ENCOUNTER — APPOINTMENT (OUTPATIENT)
Dept: ORTHOPEDIC SURGERY | Facility: CLINIC | Age: 63
End: 2023-08-29
Payer: COMMERCIAL

## 2023-08-29 VITALS
SYSTOLIC BLOOD PRESSURE: 125 MMHG | DIASTOLIC BLOOD PRESSURE: 81 MMHG | BODY MASS INDEX: 36.82 KG/M2 | WEIGHT: 195 LBS | HEART RATE: 66 BPM | HEIGHT: 61 IN

## 2023-08-29 PROCEDURE — 73030 X-RAY EXAM OF SHOULDER: CPT | Mod: 50

## 2023-08-29 PROCEDURE — 20610 DRAIN/INJ JOINT/BURSA W/O US: CPT | Mod: RT

## 2023-08-29 PROCEDURE — 99213 OFFICE O/P EST LOW 20 MIN: CPT | Mod: 25

## 2023-08-30 VITALS
WEIGHT: 195 LBS | BODY MASS INDEX: 36.82 KG/M2 | SYSTOLIC BLOOD PRESSURE: 125 MMHG | DIASTOLIC BLOOD PRESSURE: 81 MMHG | HEIGHT: 61 IN

## 2023-08-30 NOTE — DISCUSSION/SUMMARY
[de-identified] : At this time, due to osteoarthritis of the bilateral shoulders, the patient will get a cortisone to the right shoulder today (as noted above). He will come back next week to get a cortisone injection to the left shoulder.  We discussed total shoulders with the patient. We also discussed gel injections, but he states he already tried that in another office and it did not work. He will consider a total shoulder possibly down the road, but not at this time because he is not in excruciating pain.

## 2023-08-30 NOTE — PHYSICAL EXAM
[de-identified] : Right Shoulder:  He has pretty good range of motion. He has decreased internal rotation, but otherwise, he is within normal limits of all of his other ranges. Diffuse crepitations and tenderness throughout range of motion.  Pain and swelling throughout range of motion, more significant at extremes.  No motor weakness to internal rotation, external rotation or abduction in the scapular plane.  Negative crank test.  Negative OBriens test.  Negative Speeds test.  Negative Yergasons test.  Negative cross arm test.  No tenderness to palpation at the AC joint. Negative Horta sign.  Negative Neers sign.  Negative apprehension. Negative sulcus sign.  No gross neurological or vascular deficits distally.  Skin is intact.  No rashes, scars or lesions.  2+ radial and ulnar pulses. No extra-articular swelling or tenderness.  Left Shoulder:  He has pretty good range of motion. He has decreased internal rotation, but otherwise, he is within normal limits of all of his other ranges. Diffuse crepitations and tenderness throughout range of motion.  Pain and swelling throughout range of motion, more significant at extremes.  No motor weakness to internal rotation, external rotation or abduction in the scapular plane.  Negative crank test.  Negative OBriens test.  Negative Speeds test.  Negative Yergasons test.  Negative cross arm test.  No tenderness to palpation at the AC joint. Negative Horta sign.  Negative Neers sign.  Negative apprehension. Negative sulcus sign.  No gross neurological or vascular deficits distally.  Skin is intact.  No rashes, scars or lesions.  2+ radial and ulnar pulses. No extra-articular swelling or tenderness.  [de-identified] : Ambulating with a normal gait. [de-identified] : Appearance:  Well-developed, well-nourished male in no acute distress.   [de-identified] : Radiographs, which were taken in the office today, one-two views of the right shoulder and one-two views of the left shoulder, show almost severe osteoarthritis of both shoulders.

## 2023-08-30 NOTE — ADDENDUM
[FreeTextEntry1] : This note was written by Bev Yin on 08/30/2023 acting as scribe for Alia Gonzalez, OTR/L, PA

## 2023-08-30 NOTE — PROCEDURE
[de-identified] : Indication:  Osteoarthritis of the Right Shoulder  Consent:  At this time, I have recommended an injection to the right shoulder.  The risks and benefits of the procedure were discussed with the patient in detail.  Upon verbal consent of the patient, we proceeded with the injection as noted below.    Procedure:   After a sterile prep, the patient underwent an injection of 9 cc of 1% Lidocaine (10mg/mL) without epinephrine and 1 cc of Kenalog (40mg/mL) into the right shoulder.  The patient tolerated the procedure well.  There were no complications.    : [employer] Drug name:     Triamcinolone Acetonide Injectable Suspension USP NDC #:            5244-5566-78 Lot #:              [id number] Expiration:      01/2024

## 2023-09-01 ENCOUNTER — APPOINTMENT (OUTPATIENT)
Dept: ORTHOPEDIC SURGERY | Facility: CLINIC | Age: 63
End: 2023-09-01
Payer: COMMERCIAL

## 2023-09-01 VITALS
DIASTOLIC BLOOD PRESSURE: 81 MMHG | HEIGHT: 61 IN | BODY MASS INDEX: 36.82 KG/M2 | SYSTOLIC BLOOD PRESSURE: 122 MMHG | WEIGHT: 195 LBS | HEART RATE: 78 BPM

## 2023-09-01 PROCEDURE — 99213 OFFICE O/P EST LOW 20 MIN: CPT | Mod: 25

## 2023-09-01 PROCEDURE — 20610 DRAIN/INJ JOINT/BURSA W/O US: CPT | Mod: LT

## 2023-09-06 NOTE — ADDENDUM
[FreeTextEntry1] : This note was written by Olga Barton on 09/06/2023 acting as scribe for Alia Gonzalez OTR/SUZAN, PA.

## 2023-09-06 NOTE — PHYSICAL EXAM
[de-identified] : Right shoulder: Shoulder: Range of Motion in Degrees:  	 	                                  Claimant:	Normal:	 Abduction (Active)	                  180	               180 degrees	 Abduction (Passive)	  180	               180 degrees	 Forward elevation (Active):	  180	               180 degrees	 Forward elevation (Passive):	  180	               180 degrees	 External rotation (Active):	   45	               45 degrees	 External rotation (Passive):	   45	               45 degrees	 Internal rotation (Active):	   L-1	               L-1	 Internal rotation (Passive):	   L-1	               L-1	  Diffuse crepitations and tenderness throughout range of motion.  Pain and swelling throughout range of motion, more significant at extremes.  No motor weakness to internal rotation, external rotation or abduction in the scapular plane.  Negative crank test.  Negative [place]'s test.  Negative Speeds test.  Negative Yergason's test.  Negative cross arm test.  No tenderness to palpation at the AC joint. Negative Horta sign.  Negative Neer sign.  Negative apprehension. Negative sulcus sign.  No gross neurological or vascular deficits distally.  Skin is intact.  No rashes, scars or lesions.  2+ radial and ulnar pulses. No extra-articular swelling or tenderness.    Left shoulder: Shoulder: Range of Motion in Degrees:  	 	                                  Claimant:	Normal:	 Abduction (Active)	                  180	               180 degrees	 Abduction (Passive)	          180	               180 degrees	 Forward elevation (Active):	  180	               180 degrees	 Forward elevation (Passive):	  180	               180 degrees	 External rotation (Active):	   45	               45 degrees	 External rotation (Passive):	   45	               45 degrees	 Internal rotation (Active):	           L-1	               L-1	 Internal rotation (Passive):	   L-1	               L-1	  Diffuse crepitations and tenderness throughout range of motion.  Pain and swelling throughout range of motion, more significant at extremes.  No motor weakness to internal rotation, external rotation or abduction in the scapular plane.  Negative crank test.  Negative [place]'s test.  Negative Speeds test.  Negative Yergason's test.  Negative cross arm test.  No tenderness to palpation at the AC joint.  Negative Horta sign.  Negative Neer sign.  Negative apprehension. Negative sulcus sign.  No gross neurological or vascular deficits distally.  Skin is intact.  No rashes, scars or lesions.  2+ radial and ulnar pulses. No extra-articular swelling or tenderness.   [de-identified] : Gait: Normal    Station: Normal  [de-identified] : Appearance: Well-developed, well-nourished male  in no acute distress.

## 2023-09-06 NOTE — PROCEDURE
[de-identified] : Consent:  At this time, I have recommended an injection to the left shoulder.  The risks and benefits of the procedure were discussed with the patient in detail.  Upon verbal consent of the patient, we proceeded with the injection as noted below.    Indications: Osteoarthritis, left shoulder : [employer] Drug name: Triamcinolone Acetonide Injectable Suspension USP NDC#: 0143-9577-10 Lot#: [id number] Expiration date: 01/24  Procedure: After a sterile prep, the patient underwent an injection of 9 cc of 1% Lidocaine (10mg/mL) without epinephrine and 1 cc of Kenalog (40 mg/mL) into the left shoulder.  The patient tolerated the procedure well.  There were no complications.

## 2023-09-06 NOTE — HISTORY OF PRESENT ILLNESS
[de-identified] :  comes in today for his bilateral shoulders.  He had his right shoulder injected last week.  Today, he comes back for his left shoulder.

## 2023-09-06 NOTE — DISCUSSION/SUMMARY
[de-identified] : The patient presents with OA of bilateral shoulders.  The patient was given an injection to the left shoulder, as noted above.   At this time I recommend ice and elevation.  He will follow up as needed.

## 2023-11-20 ENCOUNTER — APPOINTMENT (OUTPATIENT)
Dept: ELECTROPHYSIOLOGY | Facility: CLINIC | Age: 63
End: 2023-11-20
Payer: COMMERCIAL

## 2023-11-20 ENCOUNTER — NON-APPOINTMENT (OUTPATIENT)
Age: 63
End: 2023-11-20

## 2023-11-20 VITALS
RESPIRATION RATE: 14 BRPM | DIASTOLIC BLOOD PRESSURE: 74 MMHG | OXYGEN SATURATION: 97 % | HEART RATE: 72 BPM | SYSTOLIC BLOOD PRESSURE: 117 MMHG | BODY MASS INDEX: 27.92 KG/M2 | WEIGHT: 195 LBS | HEIGHT: 70 IN

## 2023-11-20 DIAGNOSIS — Q26.3 PARTIAL ANOMALOUS PULMONARY VENOUS CONNECTION: ICD-10-CM

## 2023-11-20 DIAGNOSIS — I47.29 OTHER VENTRICULAR TACHYCARDIA: ICD-10-CM

## 2023-11-20 PROCEDURE — 99214 OFFICE O/P EST MOD 30 MIN: CPT | Mod: 25

## 2023-11-20 PROCEDURE — 93000 ELECTROCARDIOGRAM COMPLETE: CPT

## 2023-11-20 RX ORDER — DOXYCYCLINE 40 MG/1
40 CAPSULE ORAL DAILY
Refills: 0 | Status: COMPLETED | COMMUNITY
Start: 2022-06-21 | End: 2023-11-20

## 2023-11-20 RX ORDER — BUDESONIDE 3 MG/1
3 CAPSULE, COATED PELLETS ORAL
Qty: 90 | Refills: 0 | Status: COMPLETED | COMMUNITY
Start: 2022-10-12 | End: 2023-11-20

## 2023-11-20 RX ORDER — LOSARTAN POTASSIUM 50 MG/1
50 TABLET, FILM COATED ORAL
Qty: 90 | Refills: 0 | Status: ACTIVE | COMMUNITY
Start: 2023-11-20

## 2024-02-02 RX ORDER — OMEPRAZOLE 40 MG/1
40 CAPSULE, DELAYED RELEASE ORAL
Qty: 90 | Refills: 2 | Status: ACTIVE | COMMUNITY
Start: 2022-08-08 | End: 1900-01-01

## 2024-03-25 ENCOUNTER — APPOINTMENT (OUTPATIENT)
Dept: CARDIOLOGY | Facility: CLINIC | Age: 64
End: 2024-03-25
Payer: COMMERCIAL

## 2024-03-25 ENCOUNTER — NON-APPOINTMENT (OUTPATIENT)
Age: 64
End: 2024-03-25

## 2024-03-25 VITALS
DIASTOLIC BLOOD PRESSURE: 62 MMHG | OXYGEN SATURATION: 97 % | SYSTOLIC BLOOD PRESSURE: 130 MMHG | HEART RATE: 72 BPM | BODY MASS INDEX: 27.98 KG/M2 | WEIGHT: 195 LBS

## 2024-03-25 DIAGNOSIS — I10 ESSENTIAL (PRIMARY) HYPERTENSION: ICD-10-CM

## 2024-03-25 DIAGNOSIS — I08.0 RHEUMATIC DISORDERS OF BOTH MITRAL AND AORTIC VALVES: ICD-10-CM

## 2024-03-25 PROCEDURE — 93000 ELECTROCARDIOGRAM COMPLETE: CPT

## 2024-03-25 PROCEDURE — G2211 COMPLEX E/M VISIT ADD ON: CPT | Mod: NC,1L

## 2024-03-25 PROCEDURE — 99214 OFFICE O/P EST MOD 30 MIN: CPT | Mod: 25

## 2024-03-25 NOTE — HISTORY OF PRESENT ILLNESS
[FreeTextEntry1] : 62 yo male presents for evaluation of congenital heart disease. Pt has a history anomalous pulmonary venous return. Pt denies symptoms of chest pain, palpitations or dyspnea. He was evaluated at [facility] and at  [facility]. MRI was reviewed. Pt has been asymptomatic until recently when he felt lightheaded upon arising lasting 25 seconds to a minute. He may have not had a lot to drink that day. Past records were reviewed. Pt denies chest pain or shortness of breath. Pt was recently seen by [facility]. Pt exercises regularly without symptoms.

## 2024-03-25 NOTE — PHYSICAL EXAM
[General Appearance - Well Developed] : well developed [Normal Appearance] : normal appearance [Well Groomed] : well groomed [No Deformities] : no deformities [General Appearance - Well Nourished] : well nourished [General Appearance - In No Acute Distress] : no acute distress [Normal Conjunctiva] : the conjunctiva exhibited no abnormalities [Eyelids - No Xanthelasma] : the eyelids demonstrated no xanthelasmas [Normal Oral Mucosa] : normal oral mucosa [No Oral Cyanosis] : no oral cyanosis [No Oral Pallor] : no oral pallor [Normal Jugular Venous A Waves Present] : normal jugular venous A waves present [Normal Jugular Venous V Waves Present] : normal jugular venous V waves present [No Jugular Venous Sanchez A Waves] : no jugular venous sanchez A waves [Respiration, Rhythm And Depth] : normal respiratory rhythm and effort [Exaggerated Use Of Accessory Muscles For Inspiration] : no accessory muscle use [Auscultation Breath Sounds / Voice Sounds] : lungs were clear to auscultation bilaterally [Heart Rate And Rhythm] : heart rate and rhythm were normal [Heart Sounds] : normal S1 and S2 [Murmurs] : no murmurs present [Abdomen Tenderness] : non-tender [Abdomen Soft] : soft [Abdomen Mass (___ Cm)] : no abdominal mass palpated [Gait - Sufficient For Exercise Testing] : the gait was sufficient for exercise testing [Abnormal Walk] : normal gait [Nail Clubbing] : no clubbing of the fingernails [Cyanosis, Localized] : no localized cyanosis [Petechial Hemorrhages (___cm)] : no petechial hemorrhages [Skin Color & Pigmentation] : normal skin color and pigmentation [] : no rash [No Venous Stasis] : no venous stasis [Skin Lesions] : no skin lesions [No Skin Ulcers] : no skin ulcer [No Xanthoma] : no  xanthoma was observed [Oriented To Time, Place, And Person] : oriented to person, place, and time [Affect] : the affect was normal [Mood] : the mood was normal [No Anxiety] : not feeling anxious

## 2024-03-25 NOTE — DISCUSSION/SUMMARY
[FreeTextEntry1] : Pt will have repeat Echo and carotid doppler. Pt will continue to exercise. Pt will follow up in 6 month. [EKG obtained to assist in diagnosis and management of assessed problem(s)] : EKG obtained to assist in diagnosis and management of assessed problem(s)

## 2024-04-29 ENCOUNTER — APPOINTMENT (OUTPATIENT)
Dept: CARDIOLOGY | Facility: CLINIC | Age: 64
End: 2024-04-29
Payer: COMMERCIAL

## 2024-04-29 PROCEDURE — 93306 TTE W/DOPPLER COMPLETE: CPT

## 2024-05-24 ENCOUNTER — APPOINTMENT (OUTPATIENT)
Dept: ORTHOPEDIC SURGERY | Facility: CLINIC | Age: 64
End: 2024-05-24
Payer: COMMERCIAL

## 2024-05-24 VITALS
HEIGHT: 70 IN | DIASTOLIC BLOOD PRESSURE: 75 MMHG | BODY MASS INDEX: 27.92 KG/M2 | WEIGHT: 195 LBS | HEART RATE: 67 BPM | SYSTOLIC BLOOD PRESSURE: 146 MMHG

## 2024-05-24 PROCEDURE — 99213 OFFICE O/P EST LOW 20 MIN: CPT | Mod: 25

## 2024-05-24 PROCEDURE — 20610 DRAIN/INJ JOINT/BURSA W/O US: CPT | Mod: RT

## 2024-05-24 PROCEDURE — 73030 X-RAY EXAM OF SHOULDER: CPT | Mod: 50

## 2024-05-30 NOTE — ADDENDUM
[FreeTextEntry1] : This note was written by Olga Barton on 05/30/2024 acting as scribe for Alia Gonzalez OTR/SUZAN, PA.

## 2024-05-30 NOTE — DISCUSSION/SUMMARY
[de-identified] : The patient presents with OA of bilateral shoulders.  The patient was given a cortisone injection into the right shoulder, as noted above.  At this time I recommend ice and elevation. He is going to come back next week to get the left shoulder injected.

## 2024-05-30 NOTE — PROCEDURE
[de-identified] : Consent: At this time, I have recommended an injection to the right shoulder.  The risks and benefits of the procedure were discussed with the patient in detail.  Upon verbal consent of the patient, we proceeded with the injection as noted below.     Indications:  OA, right shoulder : [employer] Drug name: Triamcinolone Acetonide Injectable Suspension USP NDC#: [address]-3 Lot#: [id number] Expiration date: 08/31/25     Procedure: After a sterile prep, the patient underwent an injection of 9 cc of 1% Lidocaine (10mg/mL) without epinephrine and 1 cc of triamcinolone acetonide (40 mg/mL) into the right shoulder.  The patient tolerated the procedure well.  There were no complications.

## 2024-05-30 NOTE — PHYSICAL EXAM
[de-identified] : Right shoulder: Shoulder: Range of Motion in Degrees:                                        Claimant: Normal:  Abduction (Active)                   180                180 degrees  Abduction (Passive)                   180                180 degrees  Forward elevation (Active):   180                180 degrees  Forward elevation (Passive):   180                180 degrees  External rotation (Active):    45                45 degrees  External rotation (Passive):    45                45 degrees  Internal rotation (Active):    L-1                L-1  Internal rotation (Passive):    L-1                L-1    Diffuse crepitations and tenderness throughout range of motion.  Pain and swelling throughout range of motion, more significant at extremes.  No motor weakness to internal rotation, external rotation or abduction in the scapular plane.  Negative crank test.  Negative [place]'s test.  Negative Speeds test.  Negative Yergason's test.  Negative cross arm test.  No tenderness to palpation at the AC joint. Negative Horta sign.  Negative Neer sign.  Negative apprehension. Negative sulcus sign.  No gross neurological or vascular deficits distally.  Skin is intact.  No rashes, scars or lesions.  2+ radial and ulnar pulses. No extra-articular swelling or tenderness.   Left shoulder: Shoulder: Range of Motion in Degrees:                                        Claimant: Normal:  Abduction (Active)                   180                180 degrees  Abduction (Passive)                   180                180 degrees  Forward elevation (Active):   180                180 degrees  Forward elevation (Passive):   180                180 degrees  External rotation (Active):    45                45 degrees  External rotation (Passive):    45                45 degrees  Internal rotation (Active):    L-1                L-1  Internal rotation (Passive):    L-1                L-1    Diffuse crepitations and tenderness throughout range of motion.  Pain and swelling throughout range of motion, more significant at extremes.  No motor weakness to internal rotation, external rotation or abduction in the scapular plane.  Negative crank test.  Negative [place]'s test.  Negative Speeds test.  Negative Yergason's test.  Negative cross arm test.  No tenderness to palpation at the AC joint. Negative Horta sign.  Negative Neer sign.  Negative apprehension. Negative sulcus sign.  No gross neurological or vascular deficits distally.  Skin is intact.  No rashes, scars or lesions.  2+ radial and ulnar pulses. No extra-articular swelling or tenderness.   [de-identified] : Gait: Normal    Station: Normal  [de-identified] : Appearance: Well-developed, well-nourished male  in no acute distress.  [de-identified] : X-rays taken in the office today, two views of the right shoulder and two views of the left shoulder, reveals severe OA of bilateral shoulders.

## 2024-06-03 ENCOUNTER — APPOINTMENT (OUTPATIENT)
Dept: ORTHOPEDIC SURGERY | Facility: CLINIC | Age: 64
End: 2024-06-03
Payer: COMMERCIAL

## 2024-06-03 VITALS
HEART RATE: 71 BPM | DIASTOLIC BLOOD PRESSURE: 74 MMHG | SYSTOLIC BLOOD PRESSURE: 135 MMHG | BODY MASS INDEX: 27.3 KG/M2 | HEIGHT: 71 IN | WEIGHT: 195 LBS

## 2024-06-03 PROCEDURE — 20610 DRAIN/INJ JOINT/BURSA W/O US: CPT | Mod: LT

## 2024-06-03 PROCEDURE — 99203 OFFICE O/P NEW LOW 30 MIN: CPT | Mod: 25

## 2024-06-06 NOTE — HISTORY OF PRESENT ILLNESS
[de-identified] : The patient comes in today with persistent complaints of pain to both shoulders, left worse than right.

## 2024-06-06 NOTE — PHYSICAL EXAM
[de-identified] : Right shoulder: Shoulder: Range of Motion in Degrees:                                        Claimant: Normal:  Abduction (Active)                   180                180 degrees  Abduction (Passive)                   180                180 degrees  Forward elevation (Active):   180                180 degrees  Forward elevation (Passive):   180                180 degrees  External rotation (Active):    45                45 degrees  External rotation (Passive):    45                45 degrees  Internal rotation (Active):    L-1                L-1  Internal rotation (Passive):    L-1                L-1    Diffuse crepitations and tenderness throughout range of motion.  Pain and swelling throughout range of motion, more significant at extremes.  No motor weakness to internal rotation, external rotation or abduction in the scapular plane.  Negative crank test.  Negative [place]'s test.  Negative Speeds test.  Negative Yergason's test.  Negative cross arm test.  No tenderness to palpation at the AC joint. Negative Horta sign.  Negative Neer's sign.  Negative apprehension. Negative sulcus sign.  No gross neurological or vascular deficits distally.  Skin is intact.  No rashes, scars or lesions.  2+ radial and ulnar pulses. No extra-articular swelling or tenderness.   Left shoulder: Shoulder: Range of Motion in Degrees:                                        Claimant: Normal:  Abduction (Active)                   180                180 degrees  Abduction (Passive)                   180                180 degrees  Forward elevation (Active):   180                180 degrees  Forward elevation (Passive):   180                180 degrees  External rotation (Active):    45                45 degrees  External rotation (Passive):    45                45 degrees  Internal rotation (Active):    L-1                L-1  Internal rotation (Passive):    L-1                L-1    Diffuse crepitations and tenderness throughout range of motion.  Pain and swelling throughout range of motion, more significant at extremes.  No motor weakness to internal rotation, external rotation or abduction in the scapular plane.  Negative crank test.  Negative [place]'s test.  Negative Speeds test.  Negative Yergason's test.  Negative cross arm test.  No tenderness to palpation at the AC joint. Negative Horta sign.  Negative Neer's sign.  Negative apprehension. Negative sulcus sign.  No gross neurological or vascular deficits distally.  Skin is intact.  No rashes, scars or lesions.  2+ radial and ulnar pulses. No extra-articular swelling or tenderness.   [de-identified] : Gait: Normal    Station: Normal  [de-identified] : Appearance: Well-developed, well-nourished male  in no acute distress.

## 2024-06-06 NOTE — ADDENDUM
[FreeTextEntry1] : This note was written by Jada Beckham on 06/06/2024 acting as scribe for Giacomo Low III, MD

## 2024-06-06 NOTE — DISCUSSION/SUMMARY
[de-identified] : The patient presents with osteoarthritis of the bilateral shoulders, left worse than right.  The patient was given a cortisone injection to the left shoulder.  I recommend ice, elevation and reassessment in 3-4 weeks.

## 2024-06-06 NOTE — PROCEDURE
[de-identified] : Indication: Osteoarthritis left shoulder   Consent: At this time, I have recommended an injection to the left shoulder.  The risks and benefits of the procedure were discussed with the patient in detail.  Upon verbal consent of the patient, we proceeded with the injection as noted below.   Description of Procedure: After a sterile prep, the patient underwent an injection of approximately 9 mL of 1% Lidocaine (10 mg/mL) without epinephrine and 1 mL of triamcinolone acetonide (40 mg/mL) into the left shoulder.  The patient tolerated the procedure well.  There were no complications.   :  [employer] Drug Name:  Triamcinolone Acetonide Injectable Suspension USP NCD#:  [address]-1 Lot#:  [id number] Expiration Date:  08/31/2025

## 2024-07-01 ENCOUNTER — APPOINTMENT (OUTPATIENT)
Dept: ORTHOPEDIC SURGERY | Facility: CLINIC | Age: 64
End: 2024-07-01
Payer: COMMERCIAL

## 2024-07-01 VITALS
SYSTOLIC BLOOD PRESSURE: 136 MMHG | WEIGHT: 185 LBS | HEART RATE: 77 BPM | BODY MASS INDEX: 25.9 KG/M2 | DIASTOLIC BLOOD PRESSURE: 71 MMHG | HEIGHT: 71 IN

## 2024-07-01 DIAGNOSIS — M19.011 PRIMARY OSTEOARTHRITIS, RIGHT SHOULDER: ICD-10-CM

## 2024-07-01 DIAGNOSIS — M19.012 PRIMARY OSTEOARTHRITIS, RIGHT SHOULDER: ICD-10-CM

## 2024-07-01 PROCEDURE — 99213 OFFICE O/P EST LOW 20 MIN: CPT

## 2024-07-02 PROBLEM — M19.011 PRIMARY OSTEOARTHRITIS OF BOTH SHOULDERS: Status: ACTIVE | Noted: 2022-09-02

## 2024-08-12 ENCOUNTER — APPOINTMENT (OUTPATIENT)
Dept: ORTHOPEDIC SURGERY | Facility: CLINIC | Age: 64
End: 2024-08-12

## 2024-08-12 VITALS
WEIGHT: 180 LBS | TEMPERATURE: 97 F | HEART RATE: 66 BPM | HEIGHT: 61 IN | SYSTOLIC BLOOD PRESSURE: 105 MMHG | BODY MASS INDEX: 33.99 KG/M2 | DIASTOLIC BLOOD PRESSURE: 56 MMHG

## 2024-08-12 DIAGNOSIS — M17.0 BILATERAL PRIMARY OSTEOARTHRITIS OF KNEE: ICD-10-CM

## 2024-08-12 DIAGNOSIS — M19.012 PRIMARY OSTEOARTHRITIS, RIGHT SHOULDER: ICD-10-CM

## 2024-08-12 DIAGNOSIS — M19.011 PRIMARY OSTEOARTHRITIS, RIGHT SHOULDER: ICD-10-CM

## 2024-08-12 PROCEDURE — 99213 OFFICE O/P EST LOW 20 MIN: CPT

## 2024-08-12 RX ORDER — HYALURONATE SODIUM 10 MG/ML
25 SYRINGE (ML) INTRAARTICULAR
Qty: 10 | Refills: 0 | Status: ACTIVE | OUTPATIENT
Start: 2024-08-12

## 2024-08-14 NOTE — ADDENDUM
[FreeTextEntry1] : This note was written by Bev iYn on 08/14/2024 acting as a scribe for CHANDRAKANT RODRIGUEZ III, MD
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
## 2024-08-14 NOTE — PHYSICAL EXAM
[de-identified] : Right Shoulder:  Range of Motion in Degrees:  	 	                                                      Claimant:	            Normal:	 Abduction (Active)	                                 160	               180 degrees	 Abduction (Passive)	                         160	               180 degrees	 Forward elevation (Active):	                 160	               180 degrees	 Forward elevation (Passive):	                 160	               180 degrees	 External rotation (Active):	                   30                 45 degrees	 External rotation (Passive):	                   30                 45 degrees	 Internal rotation (Active):	                         S-1	                    L-1	 Internal rotation (Passive):	                 S-1	                    L-1	  Diffuse crepitations and tenderness throughout range of motion.  Pain and swelling throughout range of motion, more significant at extremes.  No motor weakness to internal rotation, external rotation or abduction in the scapular plane.  Negative crank test.  Negative [place]'s test.  Negative Speeds test.  Negative Yergason's test.  Negative cross arm test.  No tenderness to palpation at the AC joint. Negative Horta sign.  Negative Neers sign.  Negative apprehension. Negative sulcus sign.  No gross neurological or vascular deficits distally.  Skin is intact.  No rashes, scars or lesions.  2+ radial and ulnar pulses. No extra-articular swelling or tenderness.   Left Shoulder: Range of Motion in Degrees:  	 	                                                      Claimant:	            Normal:	 Abduction (Active)	                                 160	               180 degrees	 Abduction (Passive)	                         160	               180 degrees	 Forward elevation (Active):	                 160	               180 degrees	 Forward elevation (Passive):	                 160	               180 degrees	 External rotation (Active):	                  30                 45 degrees	 External rotation (Passive):	                  30	                 45 degrees	 Internal rotation (Active):	                         S-1	                    L-1	 Internal rotation (Passive):	                 S-1	                    L-1	  Diffuse crepitations and tenderness throughout range of motion.  Pain and swelling throughout range of motion, more significant at extremes.  No motor weakness to internal rotation, external rotation or abduction in the scapular plane.  Negative crank test.  Negative [place]'s test.  Negative Speeds test.  Negative Yergason's test.  Negative cross arm test.  No tenderness to palpation at the AC joint. Negative Horta sign.  Negative Neers sign.  Negative apprehension. Negative sulcus sign.  No gross neurological or vascular deficits distally.  Skin is intact.  No rashes, scars or lesions.  2+ radial and ulnar pulses. No extra-articular swelling or tenderness.   [de-identified] : Ambulating with a normal gait. [de-identified] : Appearance:  Well-developed, well-nourished male in no acute distress.   [de-identified] : Review of radiographs, show severe osteoarthritis of bilateral shoulders.

## 2024-08-14 NOTE — ADDENDUM
[FreeTextEntry1] : This note was written by Bev Yin on 08/14/2024 acting as a scribe for CHANDRAKANT RODRIGUEZ III, MD

## 2024-08-14 NOTE — HISTORY OF PRESENT ILLNESS
[de-identified] : The patient comes in today for his bilateral shoulders. He states he has been doing his therapy. He has had two cortisone injections, but the pain has persisted.

## 2024-08-14 NOTE — HISTORY OF PRESENT ILLNESS
[de-identified] : The patient comes in today for his bilateral shoulders. He states he has been doing his therapy. He has had two cortisone injections, but the pain has persisted.

## 2024-08-14 NOTE — PHYSICAL EXAM
[de-identified] : Right Shoulder:  Range of Motion in Degrees:  	 	                                                      Claimant:	            Normal:	 Abduction (Active)	                                 160	               180 degrees	 Abduction (Passive)	                         160	               180 degrees	 Forward elevation (Active):	                 160	               180 degrees	 Forward elevation (Passive):	                 160	               180 degrees	 External rotation (Active):	                   30                 45 degrees	 External rotation (Passive):	                   30                 45 degrees	 Internal rotation (Active):	                         S-1	                    L-1	 Internal rotation (Passive):	                 S-1	                    L-1	  Diffuse crepitations and tenderness throughout range of motion.  Pain and swelling throughout range of motion, more significant at extremes.  No motor weakness to internal rotation, external rotation or abduction in the scapular plane.  Negative crank test.  Negative [place]'s test.  Negative Speeds test.  Negative Yergason's test.  Negative cross arm test.  No tenderness to palpation at the AC joint. Negative Horta sign.  Negative Neers sign.  Negative apprehension. Negative sulcus sign.  No gross neurological or vascular deficits distally.  Skin is intact.  No rashes, scars or lesions.  2+ radial and ulnar pulses. No extra-articular swelling or tenderness.   Left Shoulder: Range of Motion in Degrees:  	 	                                                      Claimant:	            Normal:	 Abduction (Active)	                                 160	               180 degrees	 Abduction (Passive)	                         160	               180 degrees	 Forward elevation (Active):	                 160	               180 degrees	 Forward elevation (Passive):	                 160	               180 degrees	 External rotation (Active):	                  30                 45 degrees	 External rotation (Passive):	                  30	                 45 degrees	 Internal rotation (Active):	                         S-1	                    L-1	 Internal rotation (Passive):	                 S-1	                    L-1	  Diffuse crepitations and tenderness throughout range of motion.  Pain and swelling throughout range of motion, more significant at extremes.  No motor weakness to internal rotation, external rotation or abduction in the scapular plane.  Negative crank test.  Negative [place]'s test.  Negative Speeds test.  Negative Yergason's test.  Negative cross arm test.  No tenderness to palpation at the AC joint. Negative Horta sign.  Negative Neers sign.  Negative apprehension. Negative sulcus sign.  No gross neurological or vascular deficits distally.  Skin is intact.  No rashes, scars or lesions.  2+ radial and ulnar pulses. No extra-articular swelling or tenderness.   [de-identified] : Ambulating with a normal gait. [de-identified] : Appearance:  Well-developed, well-nourished male in no acute distress.   [de-identified] : Review of radiographs, show severe osteoarthritis of bilateral shoulders.

## 2024-08-14 NOTE — DISCUSSION/SUMMARY
[de-identified] : At this time, due to osteoarthritis of the bilateral shoulders, I had a long discussion concerning his options and at this point, I recommend he undergo a course of viscosupplementation, which will be scheduled at this time.

## 2024-09-16 ENCOUNTER — NON-APPOINTMENT (OUTPATIENT)
Age: 64
End: 2024-09-16

## 2024-09-16 ENCOUNTER — APPOINTMENT (OUTPATIENT)
Dept: CARDIOLOGY | Facility: CLINIC | Age: 64
End: 2024-09-16
Payer: COMMERCIAL

## 2024-09-16 VITALS
HEART RATE: 60 BPM | HEIGHT: 70 IN | OXYGEN SATURATION: 99 % | WEIGHT: 185 LBS | BODY MASS INDEX: 26.48 KG/M2 | DIASTOLIC BLOOD PRESSURE: 66 MMHG | SYSTOLIC BLOOD PRESSURE: 106 MMHG

## 2024-09-16 DIAGNOSIS — R07.89 OTHER CHEST PAIN: ICD-10-CM

## 2024-09-16 DIAGNOSIS — R73.03 PREDIABETES.: ICD-10-CM

## 2024-09-16 DIAGNOSIS — Q26.3 PARTIAL ANOMALOUS PULMONARY VENOUS CONNECTION: ICD-10-CM

## 2024-09-16 DIAGNOSIS — R94.31 ABNORMAL ELECTROCARDIOGRAM [ECG] [EKG]: ICD-10-CM

## 2024-09-16 DIAGNOSIS — I08.0 RHEUMATIC DISORDERS OF BOTH MITRAL AND AORTIC VALVES: ICD-10-CM

## 2024-09-16 DIAGNOSIS — I10 ESSENTIAL (PRIMARY) HYPERTENSION: ICD-10-CM

## 2024-09-16 PROCEDURE — 93000 ELECTROCARDIOGRAM COMPLETE: CPT

## 2024-09-16 PROCEDURE — 99214 OFFICE O/P EST MOD 30 MIN: CPT | Mod: 25

## 2024-09-16 RX ORDER — METFORMIN HYDROCHLORIDE 500 MG/1
500 TABLET, COATED ORAL DAILY
Refills: 0 | Status: ACTIVE | COMMUNITY

## 2024-09-16 NOTE — HISTORY OF PRESENT ILLNESS
[FreeTextEntry1] : 63 yo male presents for evaluation of congenital heart disease. Pt has a history anomalous pulmonary venous return. He states he was recently placed on metformin for prediabetes. He continues to exercise, he has complaint of arthritis in shoulders, but also feels occasional chest discomfort while exercising. Pt denies symptoms of palpitations or dyspnea. Recent echocardiogram revealed improvement in RV size. He was evaluated at [facility] and at [facility]. MRI was reviewed. Past records were reviewed. Pt denies chest pain or shortness of breath.

## 2024-09-16 NOTE — REVIEW OF SYSTEMS
[Chest Discomfort] : chest discomfort [Joint Pain] : joint pain [Negative] : Heme/Lymph [FreeTextEntry5] : chest discomfort as above [FreeTextEntry9] : shoulders

## 2024-09-16 NOTE — CARDIOLOGY SUMMARY
[de-identified] : 4/29/24 CONCLUSIONS: 1. Left ventricular systolic function is normal with an ejection fraction visually estimated at 60 to 65 %. 2. There is mild (grade 1) left ventricular diastolic dysfunction. 3. Normal left and right atrial size. 4. Trace mitral regurgitation. 5. Trace tricuspid regurgitation. 6. Mild to moderate aortic regurgitation. 7. Mild to moderate pulmonic regurgitation. 8. Trileaflet aortic valve with normal systolic excursion. There is mild calcification of the aortic valve leaflet

## 2024-09-16 NOTE — DISCUSSION/SUMMARY
[FreeTextEntry1] : Pt with atypical chest discomfort, IRBBB will have exercise stress to evaluate current symptoms. Pt will continue to exercise.  [EKG obtained to assist in diagnosis and management of assessed problem(s)] : EKG obtained to assist in diagnosis and management of assessed problem(s)

## 2024-09-30 ENCOUNTER — APPOINTMENT (OUTPATIENT)
Dept: CARDIOLOGY | Facility: CLINIC | Age: 64
End: 2024-09-30
Payer: COMMERCIAL

## 2024-09-30 PROCEDURE — 93015 CV STRESS TEST SUPVJ I&R: CPT

## 2024-10-04 RX ORDER — HYALURONATE SODIUM 10 MG/ML
25 SYRINGE (ML) INTRAARTICULAR
Qty: 10 | Refills: 0 | Status: ACTIVE | OUTPATIENT
Start: 2024-10-04

## 2024-10-07 ENCOUNTER — APPOINTMENT (OUTPATIENT)
Dept: ORTHOPEDIC SURGERY | Facility: CLINIC | Age: 64
End: 2024-10-07
Payer: COMMERCIAL

## 2024-10-07 DIAGNOSIS — M19.011 PRIMARY OSTEOARTHRITIS, RIGHT SHOULDER: ICD-10-CM

## 2024-10-07 DIAGNOSIS — M19.012 PRIMARY OSTEOARTHRITIS, RIGHT SHOULDER: ICD-10-CM

## 2024-10-07 PROCEDURE — 20610 DRAIN/INJ JOINT/BURSA W/O US: CPT | Mod: 50

## 2024-10-21 ENCOUNTER — APPOINTMENT (OUTPATIENT)
Dept: ORTHOPEDIC SURGERY | Facility: CLINIC | Age: 64
End: 2024-10-21

## 2024-10-21 PROCEDURE — 20610 DRAIN/INJ JOINT/BURSA W/O US: CPT | Mod: 50

## 2024-10-28 ENCOUNTER — APPOINTMENT (OUTPATIENT)
Dept: ORTHOPEDIC SURGERY | Facility: CLINIC | Age: 64
End: 2024-10-28

## 2024-10-28 DIAGNOSIS — M17.0 BILATERAL PRIMARY OSTEOARTHRITIS OF KNEE: ICD-10-CM

## 2024-10-28 PROCEDURE — 20610 DRAIN/INJ JOINT/BURSA W/O US: CPT | Mod: 50

## 2024-11-04 ENCOUNTER — APPOINTMENT (OUTPATIENT)
Dept: ORTHOPEDIC SURGERY | Facility: CLINIC | Age: 64
End: 2024-11-04

## 2024-11-04 PROCEDURE — 20610 DRAIN/INJ JOINT/BURSA W/O US: CPT | Mod: 50

## 2024-11-11 ENCOUNTER — APPOINTMENT (OUTPATIENT)
Dept: ORTHOPEDIC SURGERY | Facility: CLINIC | Age: 64
End: 2024-11-11

## 2024-11-11 DIAGNOSIS — M19.012 PRIMARY OSTEOARTHRITIS, RIGHT SHOULDER: ICD-10-CM

## 2024-11-11 DIAGNOSIS — M19.011 PRIMARY OSTEOARTHRITIS, RIGHT SHOULDER: ICD-10-CM

## 2024-11-11 PROCEDURE — 20610 DRAIN/INJ JOINT/BURSA W/O US: CPT | Mod: 50

## 2024-11-15 ENCOUNTER — APPOINTMENT (OUTPATIENT)
Dept: ELECTROPHYSIOLOGY | Facility: CLINIC | Age: 64
End: 2024-11-15

## 2024-11-15 ENCOUNTER — NON-APPOINTMENT (OUTPATIENT)
Age: 64
End: 2024-11-15

## 2024-11-15 VITALS
DIASTOLIC BLOOD PRESSURE: 70 MMHG | HEART RATE: 66 BPM | SYSTOLIC BLOOD PRESSURE: 128 MMHG | OXYGEN SATURATION: 99 % | RESPIRATION RATE: 14 BRPM

## 2024-11-15 PROCEDURE — 93000 ELECTROCARDIOGRAM COMPLETE: CPT

## 2024-11-15 PROCEDURE — G2211 COMPLEX E/M VISIT ADD ON: CPT | Mod: NC

## 2024-11-15 PROCEDURE — 99214 OFFICE O/P EST MOD 30 MIN: CPT

## 2024-12-23 ENCOUNTER — APPOINTMENT (OUTPATIENT)
Dept: ORTHOPEDIC SURGERY | Facility: CLINIC | Age: 64
End: 2024-12-23

## 2024-12-23 VITALS
HEIGHT: 70 IN | WEIGHT: 185 LBS | SYSTOLIC BLOOD PRESSURE: 136 MMHG | DIASTOLIC BLOOD PRESSURE: 74 MMHG | BODY MASS INDEX: 26.48 KG/M2 | HEART RATE: 68 BPM

## 2024-12-23 DIAGNOSIS — M19.012 PRIMARY OSTEOARTHRITIS, RIGHT SHOULDER: ICD-10-CM

## 2024-12-23 DIAGNOSIS — M19.011 PRIMARY OSTEOARTHRITIS, RIGHT SHOULDER: ICD-10-CM

## 2024-12-23 PROCEDURE — 73030 X-RAY EXAM OF SHOULDER: CPT | Mod: 50

## 2024-12-23 PROCEDURE — 99213 OFFICE O/P EST LOW 20 MIN: CPT

## 2025-03-21 ENCOUNTER — NON-APPOINTMENT (OUTPATIENT)
Age: 65
End: 2025-03-21

## 2025-03-21 ENCOUNTER — APPOINTMENT (OUTPATIENT)
Dept: GASTROENTEROLOGY | Facility: CLINIC | Age: 65
End: 2025-03-21
Payer: COMMERCIAL

## 2025-03-21 VITALS
HEIGHT: 70 IN | BODY MASS INDEX: 27.2 KG/M2 | DIASTOLIC BLOOD PRESSURE: 72 MMHG | SYSTOLIC BLOOD PRESSURE: 124 MMHG | WEIGHT: 190 LBS

## 2025-03-21 DIAGNOSIS — Z09 ENCOUNTER FOR FOLLOW-UP EXAMINATION AFTER COMPLETED TREATMENT FOR CONDITIONS OTHER THAN MALIGNANT NEOPLASM: ICD-10-CM

## 2025-03-21 DIAGNOSIS — K50.90 CROHN'S DISEASE, UNSPECIFIED, W/OUT COMPLICATIONS: ICD-10-CM

## 2025-03-21 DIAGNOSIS — K21.9 GASTRO-ESOPHAGEAL REFLUX DISEASE W/OUT ESOPHAGITIS: ICD-10-CM

## 2025-03-21 DIAGNOSIS — K64.9 UNSPECIFIED HEMORRHOIDS: ICD-10-CM

## 2025-03-21 PROCEDURE — 99214 OFFICE O/P EST MOD 30 MIN: CPT

## 2025-05-02 ENCOUNTER — NON-APPOINTMENT (OUTPATIENT)
Age: 65
End: 2025-05-02

## 2025-05-02 ENCOUNTER — APPOINTMENT (OUTPATIENT)
Dept: INTERNAL MEDICINE | Facility: CLINIC | Age: 65
End: 2025-05-02
Payer: COMMERCIAL

## 2025-05-02 VITALS
SYSTOLIC BLOOD PRESSURE: 126 MMHG | BODY MASS INDEX: 35.3 KG/M2 | WEIGHT: 187 LBS | DIASTOLIC BLOOD PRESSURE: 76 MMHG | HEIGHT: 61 IN

## 2025-05-02 DIAGNOSIS — R73.03 PREDIABETES.: ICD-10-CM

## 2025-05-02 DIAGNOSIS — L65.9 NONSCARRING HAIR LOSS, UNSPECIFIED: ICD-10-CM

## 2025-05-02 DIAGNOSIS — N40.1 BENIGN PROSTATIC HYPERPLASIA WITH LOWER URINARY TRACT SYMPMS: ICD-10-CM

## 2025-05-02 DIAGNOSIS — I10 ESSENTIAL (PRIMARY) HYPERTENSION: ICD-10-CM

## 2025-05-02 DIAGNOSIS — Z00.00 ENCOUNTER FOR GENERAL ADULT MEDICAL EXAMINATION W/OUT ABNORMAL FINDINGS: ICD-10-CM

## 2025-05-02 DIAGNOSIS — K21.9 GASTRO-ESOPHAGEAL REFLUX DISEASE W/OUT ESOPHAGITIS: ICD-10-CM

## 2025-05-02 PROCEDURE — 36415 COLL VENOUS BLD VENIPUNCTURE: CPT

## 2025-05-02 PROCEDURE — 99387 INIT PM E/M NEW PAT 65+ YRS: CPT

## 2025-05-02 RX ORDER — METFORMIN ER 500 MG 500 MG/1
500 TABLET ORAL
Qty: 90 | Refills: 1 | Status: ACTIVE | COMMUNITY
Start: 2025-05-02 | End: 1900-01-01

## 2025-05-03 LAB
ALBUMIN SERPL ELPH-MCNC: 4.7 G/DL
ALP BLD-CCNC: 80 U/L
ALT SERPL-CCNC: 27 U/L
ANION GAP SERPL CALC-SCNC: 14 MMOL/L
AST SERPL-CCNC: 24 U/L
BASOPHILS # BLD AUTO: 0.08 K/UL
BASOPHILS NFR BLD AUTO: 1.5 %
BILIRUB SERPL-MCNC: 0.5 MG/DL
BUN SERPL-MCNC: 18 MG/DL
CALCIUM SERPL-MCNC: 9.6 MG/DL
CHLORIDE SERPL-SCNC: 105 MMOL/L
CHOLEST SERPL-MCNC: 223 MG/DL
CO2 SERPL-SCNC: 24 MMOL/L
CREAT SERPL-MCNC: 1.27 MG/DL
EGFRCR SERPLBLD CKD-EPI 2021: 62 ML/MIN/1.73M2
EOSINOPHIL # BLD AUTO: 0.37 K/UL
EOSINOPHIL NFR BLD AUTO: 7.1 %
ESTIMATED AVERAGE GLUCOSE: 120 MG/DL
GLUCOSE SERPL-MCNC: 104 MG/DL
HBA1C MFR BLD HPLC: 5.8 %
HCT VFR BLD CALC: 44.5 %
HDLC SERPL-MCNC: 49 MG/DL
HGB BLD-MCNC: 14.6 G/DL
IMM GRANULOCYTES NFR BLD AUTO: 0.2 %
LDLC SERPL-MCNC: 165 MG/DL
LYMPHOCYTES # BLD AUTO: 1.16 K/UL
LYMPHOCYTES NFR BLD AUTO: 22.2 %
MAN DIFF?: NORMAL
MCHC RBC-ENTMCNC: 29.8 PG
MCHC RBC-ENTMCNC: 32.8 G/DL
MCV RBC AUTO: 90.8 FL
MONOCYTES # BLD AUTO: 0.53 K/UL
MONOCYTES NFR BLD AUTO: 10.1 %
NEUTROPHILS # BLD AUTO: 3.08 K/UL
NEUTROPHILS NFR BLD AUTO: 58.9 %
NONHDLC SERPL-MCNC: 175 MG/DL
PLATELET # BLD AUTO: 234 K/UL
POTASSIUM SERPL-SCNC: 5.1 MMOL/L
PROT SERPL-MCNC: 7.1 G/DL
PSA SERPL-MCNC: 0.49 NG/ML
RBC # BLD: 4.9 M/UL
RBC # FLD: 14.3 %
SODIUM SERPL-SCNC: 143 MMOL/L
TRIGL SERPL-MCNC: 54 MG/DL
TSH SERPL-ACNC: 2.78 UIU/ML
WBC # FLD AUTO: 5.23 K/UL

## 2025-05-16 ENCOUNTER — NON-APPOINTMENT (OUTPATIENT)
Age: 65
End: 2025-05-16

## 2025-05-16 ENCOUNTER — APPOINTMENT (OUTPATIENT)
Dept: ELECTROPHYSIOLOGY | Facility: CLINIC | Age: 65
End: 2025-05-16
Payer: COMMERCIAL

## 2025-05-16 VITALS — SYSTOLIC BLOOD PRESSURE: 118 MMHG | DIASTOLIC BLOOD PRESSURE: 66 MMHG

## 2025-05-16 VITALS
DIASTOLIC BLOOD PRESSURE: 66 MMHG | OXYGEN SATURATION: 99 % | SYSTOLIC BLOOD PRESSURE: 135 MMHG | HEART RATE: 64 BPM | BODY MASS INDEX: 27.2 KG/M2 | WEIGHT: 190 LBS | HEIGHT: 70 IN

## 2025-05-16 DIAGNOSIS — I51.7 CARDIOMEGALY: ICD-10-CM

## 2025-05-16 DIAGNOSIS — Q26.3 PARTIAL ANOMALOUS PULMONARY VENOUS CONNECTION: ICD-10-CM

## 2025-05-16 DIAGNOSIS — I47.29 OTHER VENTRICULAR TACHYCARDIA: ICD-10-CM

## 2025-05-16 PROCEDURE — 93000 ELECTROCARDIOGRAM COMPLETE: CPT

## 2025-05-16 PROCEDURE — 99214 OFFICE O/P EST MOD 30 MIN: CPT | Mod: 25

## 2025-05-20 DIAGNOSIS — E78.5 HYPERLIPIDEMIA, UNSPECIFIED: ICD-10-CM

## 2025-05-20 RX ORDER — ATORVASTATIN CALCIUM 10 MG/1
10 TABLET, FILM COATED ORAL
Qty: 90 | Refills: 0 | Status: ACTIVE | COMMUNITY
Start: 2025-05-20 | End: 1900-01-01

## 2025-06-02 ENCOUNTER — APPOINTMENT (OUTPATIENT)
Dept: CARDIOLOGY | Facility: CLINIC | Age: 65
End: 2025-06-02
Payer: COMMERCIAL

## 2025-06-02 PROCEDURE — 93306 TTE W/DOPPLER COMPLETE: CPT
